# Patient Record
Sex: MALE | Race: WHITE | NOT HISPANIC OR LATINO | Employment: OTHER | URBAN - METROPOLITAN AREA
[De-identification: names, ages, dates, MRNs, and addresses within clinical notes are randomized per-mention and may not be internally consistent; named-entity substitution may affect disease eponyms.]

---

## 2018-08-15 ENCOUNTER — HOSPITAL ENCOUNTER (OUTPATIENT)
Facility: MEDICAL CENTER | Age: 73
End: 2018-08-15
Admitting: HOSPITALIST
Payer: MEDICARE

## 2018-08-15 ENCOUNTER — HOSPITAL ENCOUNTER (OUTPATIENT)
Facility: MEDICAL CENTER | Age: 73
End: 2018-08-17
Attending: HOSPITALIST | Admitting: HOSPITALIST
Payer: MEDICARE

## 2018-08-15 PROCEDURE — G0378 HOSPITAL OBSERVATION PER HR: HCPCS

## 2018-08-16 ENCOUNTER — APPOINTMENT (OUTPATIENT)
Dept: RADIOLOGY | Facility: MEDICAL CENTER | Age: 73
End: 2018-08-16
Attending: HOSPITALIST
Payer: MEDICARE

## 2018-08-16 PROBLEM — G92.8 TOXIC METABOLIC ENCEPHALOPATHY: Status: ACTIVE | Noted: 2018-08-16

## 2018-08-16 PROBLEM — G93.40 ENCEPHALOPATHY: Status: ACTIVE | Noted: 2018-08-16

## 2018-08-16 PROBLEM — E78.5 HLD (HYPERLIPIDEMIA): Status: ACTIVE | Noted: 2018-08-16

## 2018-08-16 PROBLEM — R00.1 BRADYCARDIA: Status: ACTIVE | Noted: 2018-08-16

## 2018-08-16 PROBLEM — E27.40 ADRENAL INSUFFICIENCY (HCC): Status: ACTIVE | Noted: 2018-08-16

## 2018-08-16 LAB
ALBUMIN SERPL BCP-MCNC: 3.5 G/DL (ref 3.2–4.9)
ALBUMIN/GLOB SERPL: 1.7 G/DL
ALP SERPL-CCNC: 20 U/L (ref 30–99)
ALT SERPL-CCNC: 11 U/L (ref 2–50)
ANION GAP SERPL CALC-SCNC: 10 MMOL/L (ref 0–11.9)
AST SERPL-CCNC: 17 U/L (ref 12–45)
BASOPHILS # BLD AUTO: 0.6 % (ref 0–1.8)
BASOPHILS # BLD: 0.05 K/UL (ref 0–0.12)
BILIRUB SERPL-MCNC: 0.4 MG/DL (ref 0.1–1.5)
BUN SERPL-MCNC: 15 MG/DL (ref 8–22)
CALCIUM SERPL-MCNC: 8.8 MG/DL (ref 8.5–10.5)
CHLORIDE SERPL-SCNC: 108 MMOL/L (ref 96–112)
CHOLEST SERPL-MCNC: 131 MG/DL (ref 100–199)
CO2 SERPL-SCNC: 23 MMOL/L (ref 20–33)
CREAT SERPL-MCNC: 0.93 MG/DL (ref 0.5–1.4)
EOSINOPHIL # BLD AUTO: 0.03 K/UL (ref 0–0.51)
EOSINOPHIL NFR BLD: 0.3 % (ref 0–6.9)
ERYTHROCYTE [DISTWIDTH] IN BLOOD BY AUTOMATED COUNT: 42.9 FL (ref 35.9–50)
EST. AVERAGE GLUCOSE BLD GHB EST-MCNC: 131 MG/DL
GLOBULIN SER CALC-MCNC: 2.1 G/DL (ref 1.9–3.5)
GLUCOSE SERPL-MCNC: 81 MG/DL (ref 65–99)
HBA1C MFR BLD: 6.2 % (ref 0–5.6)
HCT VFR BLD AUTO: 42.4 % (ref 42–52)
HDLC SERPL-MCNC: 57 MG/DL
HGB BLD-MCNC: 14.5 G/DL (ref 14–18)
IMM GRANULOCYTES # BLD AUTO: 0.04 K/UL (ref 0–0.11)
IMM GRANULOCYTES NFR BLD AUTO: 0.5 % (ref 0–0.9)
LDLC SERPL CALC-MCNC: 46 MG/DL
LYMPHOCYTES # BLD AUTO: 3.07 K/UL (ref 1–4.8)
LYMPHOCYTES NFR BLD: 35.4 % (ref 22–41)
MAGNESIUM SERPL-MCNC: 2 MG/DL (ref 1.5–2.5)
MCH RBC QN AUTO: 31.6 PG (ref 27–33)
MCHC RBC AUTO-ENTMCNC: 34.2 G/DL (ref 33.7–35.3)
MCV RBC AUTO: 92.4 FL (ref 81.4–97.8)
MONOCYTES # BLD AUTO: 0.76 K/UL (ref 0–0.85)
MONOCYTES NFR BLD AUTO: 8.8 % (ref 0–13.4)
NEUTROPHILS # BLD AUTO: 4.73 K/UL (ref 1.82–7.42)
NEUTROPHILS NFR BLD: 54.4 % (ref 44–72)
NRBC # BLD AUTO: 0 K/UL
NRBC BLD-RTO: 0 /100 WBC
PLATELET # BLD AUTO: 173 K/UL (ref 164–446)
PMV BLD AUTO: 9.3 FL (ref 9–12.9)
POTASSIUM SERPL-SCNC: 3.7 MMOL/L (ref 3.6–5.5)
PROT SERPL-MCNC: 5.6 G/DL (ref 6–8.2)
RBC # BLD AUTO: 4.59 M/UL (ref 4.7–6.1)
SODIUM SERPL-SCNC: 141 MMOL/L (ref 135–145)
TRIGL SERPL-MCNC: 140 MG/DL (ref 0–149)
TSH SERPL DL<=0.005 MIU/L-ACNC: 2.56 UIU/ML (ref 0.38–5.33)
WBC # BLD AUTO: 8.7 K/UL (ref 4.8–10.8)

## 2018-08-16 PROCEDURE — 700111 HCHG RX REV CODE 636 W/ 250 OVERRIDE (IP): Performed by: HOSPITALIST

## 2018-08-16 PROCEDURE — 80053 COMPREHEN METABOLIC PANEL: CPT

## 2018-08-16 PROCEDURE — 700102 HCHG RX REV CODE 250 W/ 637 OVERRIDE(OP): Performed by: HOSPITALIST

## 2018-08-16 PROCEDURE — G8980 MOBILITY D/C STATUS: HCPCS | Mod: CH

## 2018-08-16 PROCEDURE — 95951 EEG: CPT | Mod: 52

## 2018-08-16 PROCEDURE — 83735 ASSAY OF MAGNESIUM: CPT

## 2018-08-16 PROCEDURE — 36415 COLL VENOUS BLD VENIPUNCTURE: CPT

## 2018-08-16 PROCEDURE — 97161 PT EVAL LOW COMPLEX 20 MIN: CPT

## 2018-08-16 PROCEDURE — 70551 MRI BRAIN STEM W/O DYE: CPT

## 2018-08-16 PROCEDURE — A9270 NON-COVERED ITEM OR SERVICE: HCPCS | Performed by: HOSPITALIST

## 2018-08-16 PROCEDURE — G8989 SELF CARE D/C STATUS: HCPCS | Mod: CH

## 2018-08-16 PROCEDURE — 700105 HCHG RX REV CODE 258: Performed by: HOSPITALIST

## 2018-08-16 PROCEDURE — 85025 COMPLETE CBC W/AUTO DIFF WBC: CPT

## 2018-08-16 PROCEDURE — G8979 MOBILITY GOAL STATUS: HCPCS | Mod: CH

## 2018-08-16 PROCEDURE — A9270 NON-COVERED ITEM OR SERVICE: HCPCS | Performed by: NURSE PRACTITIONER

## 2018-08-16 PROCEDURE — 700102 HCHG RX REV CODE 250 W/ 637 OVERRIDE(OP): Performed by: NURSE PRACTITIONER

## 2018-08-16 PROCEDURE — G0378 HOSPITAL OBSERVATION PER HR: HCPCS

## 2018-08-16 PROCEDURE — 84443 ASSAY THYROID STIM HORMONE: CPT

## 2018-08-16 PROCEDURE — 99220 PR INITIAL OBSERVATION CARE,LEVL III: CPT | Performed by: HOSPITALIST

## 2018-08-16 PROCEDURE — G8987 SELF CARE CURRENT STATUS: HCPCS | Mod: CH

## 2018-08-16 PROCEDURE — G8988 SELF CARE GOAL STATUS: HCPCS | Mod: CH

## 2018-08-16 PROCEDURE — G8978 MOBILITY CURRENT STATUS: HCPCS | Mod: CH

## 2018-08-16 PROCEDURE — 83036 HEMOGLOBIN GLYCOSYLATED A1C: CPT

## 2018-08-16 PROCEDURE — 80061 LIPID PANEL: CPT

## 2018-08-16 PROCEDURE — 97165 OT EVAL LOW COMPLEX 30 MIN: CPT

## 2018-08-16 RX ORDER — IPRATROPIUM BROMIDE 21 UG/1
2 SPRAY, METERED NASAL
COMMUNITY
End: 2018-09-22 | Stop reason: CLARIF

## 2018-08-16 RX ORDER — HYDROCORTISONE 10 MG/1
5 TABLET ORAL DAILY
COMMUNITY
Start: 2018-08-16 | End: 2018-08-18

## 2018-08-16 RX ORDER — NIACIN 100 MG
100 TABLET ORAL DAILY
Status: DISCONTINUED | OUTPATIENT
Start: 2018-08-16 | End: 2018-08-16

## 2018-08-16 RX ORDER — LABETALOL HYDROCHLORIDE 5 MG/ML
10 INJECTION, SOLUTION INTRAVENOUS EVERY 4 HOURS PRN
Status: DISCONTINUED | OUTPATIENT
Start: 2018-08-16 | End: 2018-08-17 | Stop reason: HOSPADM

## 2018-08-16 RX ORDER — HYDRALAZINE HYDROCHLORIDE 20 MG/ML
10 INJECTION INTRAMUSCULAR; INTRAVENOUS
Status: DISCONTINUED | OUTPATIENT
Start: 2018-08-16 | End: 2018-08-17 | Stop reason: HOSPADM

## 2018-08-16 RX ORDER — CHOLECALCIFEROL (VITAMIN D3) 125 MCG
5 CAPSULE ORAL
Status: DISCONTINUED | OUTPATIENT
Start: 2018-08-16 | End: 2018-08-16

## 2018-08-16 RX ORDER — AMOXICILLIN 250 MG
2 CAPSULE ORAL 2 TIMES DAILY
Status: DISCONTINUED | OUTPATIENT
Start: 2018-08-16 | End: 2018-08-17 | Stop reason: HOSPADM

## 2018-08-16 RX ORDER — POLYETHYLENE GLYCOL 3350 17 G/17G
1 POWDER, FOR SOLUTION ORAL
Status: DISCONTINUED | OUTPATIENT
Start: 2018-08-16 | End: 2018-08-17 | Stop reason: HOSPADM

## 2018-08-16 RX ORDER — EZETIMIBE 10 MG/1
10 TABLET ORAL
Status: DISCONTINUED | OUTPATIENT
Start: 2018-08-16 | End: 2018-08-17 | Stop reason: HOSPADM

## 2018-08-16 RX ORDER — BISACODYL 10 MG
10 SUPPOSITORY, RECTAL RECTAL
Status: DISCONTINUED | OUTPATIENT
Start: 2018-08-16 | End: 2018-08-17 | Stop reason: HOSPADM

## 2018-08-16 RX ORDER — EZETIMIBE 10 MG/1
10 TABLET ORAL DAILY
Status: DISCONTINUED | OUTPATIENT
Start: 2018-08-16 | End: 2018-08-16

## 2018-08-16 RX ORDER — HYDROCORTISONE 5 MG/1
5 TABLET ORAL DAILY
Status: DISCONTINUED | OUTPATIENT
Start: 2018-08-16 | End: 2018-08-17 | Stop reason: HOSPADM

## 2018-08-16 RX ORDER — CHOLECALCIFEROL (VITAMIN D3) 125 MCG
5 CAPSULE ORAL
COMMUNITY
End: 2018-09-22 | Stop reason: CLARIF

## 2018-08-16 RX ORDER — NIACIN 100 MG
100 TABLET ORAL
Status: DISCONTINUED | OUTPATIENT
Start: 2018-08-16 | End: 2018-08-17 | Stop reason: HOSPADM

## 2018-08-16 RX ORDER — SODIUM CHLORIDE 9 MG/ML
1000 INJECTION, SOLUTION INTRAVENOUS ONCE
Status: COMPLETED | OUTPATIENT
Start: 2018-08-16 | End: 2018-08-16

## 2018-08-16 RX ADMIN — HYDROCORTISONE 5 MG: 5 TABLET ORAL at 18:23

## 2018-08-16 RX ADMIN — ASPIRIN 81 MG: 81 TABLET, DELAYED RELEASE ORAL at 05:53

## 2018-08-16 RX ADMIN — Medication 100 MG: at 20:13

## 2018-08-16 RX ADMIN — DOCUSATE SODIUM -SENNOSIDES 2 TABLET: 50; 8.6 TABLET, COATED ORAL at 05:52

## 2018-08-16 RX ADMIN — EZETIMIBE 10 MG: 10 TABLET ORAL at 20:14

## 2018-08-16 RX ADMIN — SODIUM CHLORIDE 1000 ML: 9 INJECTION, SOLUTION INTRAVENOUS at 02:15

## 2018-08-16 RX ADMIN — DOCUSATE SODIUM -SENNOSIDES 2 TABLET: 50; 8.6 TABLET, COATED ORAL at 18:23

## 2018-08-16 RX ADMIN — PREDNISONE 8 MG: 1 TABLET ORAL at 05:52

## 2018-08-16 ASSESSMENT — LIFESTYLE VARIABLES
EVER_SMOKED: YES
ALCOHOL_USE: NO

## 2018-08-16 ASSESSMENT — COPD QUESTIONNAIRES
DURING THE PAST 4 WEEKS HOW MUCH DID YOU FEEL SHORT OF BREATH: NONE/LITTLE OF THE TIME
IN THE PAST 12 MONTHS DO YOU DO LESS THAN YOU USED TO BECAUSE OF YOUR BREATHING PROBLEMS: DISAGREE/UNSURE
COPD SCREENING SCORE: 4
HAVE YOU SMOKED AT LEAST 100 CIGARETTES IN YOUR ENTIRE LIFE: YES
DO YOU EVER COUGH UP ANY MUCUS OR PHLEGM?: NO/ONLY WITH OCCASIONAL COLDS OR INFECTIONS

## 2018-08-16 ASSESSMENT — COGNITIVE AND FUNCTIONAL STATUS - GENERAL
SUGGESTED CMS G CODE MODIFIER MOBILITY: CH
DAILY ACTIVITIY SCORE: 24
SUGGESTED CMS G CODE MODIFIER MOBILITY: CH
MOBILITY SCORE: 24
MOBILITY SCORE: 24
SUGGESTED CMS G CODE MODIFIER DAILY ACTIVITY: CH
DAILY ACTIVITIY SCORE: 24
SUGGESTED CMS G CODE MODIFIER DAILY ACTIVITY: CH

## 2018-08-16 ASSESSMENT — ENCOUNTER SYMPTOMS
PHOTOPHOBIA: 0
WEAKNESS: 0
HEADACHES: 0
SPEECH CHANGE: 0
CONSTIPATION: 0
SEIZURES: 0
MYALGIAS: 0
BLOOD IN STOOL: 0
INSOMNIA: 0
PALPITATIONS: 0
CHILLS: 0
DIARRHEA: 0
SHORTNESS OF BREATH: 0
VOMITING: 0
FOCAL WEAKNESS: 0
BRUISES/BLEEDS EASILY: 0
FEVER: 0
SORE THROAT: 0
SPUTUM PRODUCTION: 0
NERVOUS/ANXIOUS: 0
DIZZINESS: 0
SENSORY CHANGE: 0
MEMORY LOSS: 1
ABDOMINAL PAIN: 0
DIAPHORESIS: 0
TREMORS: 0
CLAUDICATION: 0
HEMOPTYSIS: 0
LOSS OF CONSCIOUSNESS: 1
WHEEZING: 0
DEPRESSION: 0
COUGH: 0
TINGLING: 0
NAUSEA: 0
PND: 0
ORTHOPNEA: 0

## 2018-08-16 ASSESSMENT — PAIN SCALES - GENERAL
PAINLEVEL_OUTOF10: 0

## 2018-08-16 ASSESSMENT — GAIT ASSESSMENTS
DISTANCE (FEET): 500
GAIT LEVEL OF ASSIST: INDEPENDENT

## 2018-08-16 ASSESSMENT — PATIENT HEALTH QUESTIONNAIRE - PHQ9
1. LITTLE INTEREST OR PLEASURE IN DOING THINGS: NOT AT ALL
2. FEELING DOWN, DEPRESSED, IRRITABLE, OR HOPELESS: NOT AT ALL
SUM OF ALL RESPONSES TO PHQ9 QUESTIONS 1 AND 2: 0

## 2018-08-16 ASSESSMENT — ACTIVITIES OF DAILY LIVING (ADL): TOILETING: INDEPENDENT

## 2018-08-16 NOTE — PROGRESS NOTES
Direct Admit from Dr. Salcedo at [facility] Dr. Marmolejo is accepting for TIA/Stroke. ADT signed and held and will need to be released upon patient arrival.patient arriving via ground transport.

## 2018-08-16 NOTE — ASSESSMENT & PLAN NOTE
Sinus 40s-50s.  At baseline, but will still include in the differential.  Continue tele monitoring.

## 2018-08-16 NOTE — PROGRESS NOTES
[facility] Hospitalist Progress Note    Date of Service: 2018    Chief Complaint  73 y.o. male admitted 8/15/2018 with syncope x 1 minute followed by appox 1-1.5 hours of confusion.    Interval Problem Update  Feeling back to normal now.  Does not recall anything that happened or how he got to the hospital. No further episodes of unresponsiveness.  No seizure activity.     Is bradycardic on telemetry in the 40s-50s, which he states is his baseline & denies ever being symptomatic from it. Is also borderline hypotensive at baseline, usually around 100 systolic.    CT head, lab work, & UA from [facility] were reviewed & noted to be normal.  Repeat labs today were also normal.      Orthostatics negative. Has been cleared by PT/OT.  EEG showed no seizure activity.  Carotid duplex, echo, & MRI still pending.    Consultants/Specialty  Neurology    Disposition  Monitor overnight.        Review of Systems   Constitutional: Negative for chills, diaphoresis, fever and malaise/fatigue.   HENT: Negative for nosebleeds.    Respiratory: Negative for cough, hemoptysis, sputum production, shortness of breath and wheezing.    Cardiovascular: Negative for chest pain, palpitations, orthopnea, claudication, leg swelling and PND.   Gastrointestinal: Negative for abdominal pain, blood in stool, constipation, diarrhea, melena, nausea and vomiting.   Genitourinary: Negative for dysuria, frequency, hematuria and urgency.   Neurological: Positive for loss of consciousness. Negative for dizziness, tingling, tremors, sensory change, speech change, focal weakness, seizures, weakness and headaches.   Endo/Heme/Allergies: Does not bruise/bleed easily.   Psychiatric/Behavioral: Positive for memory loss. Negative for depression. The patient is not nervous/anxious and does not have insomnia.    All other systems reviewed and are negative.     Physical Exam  Laboratory/Imaging   Hemodynamics  Temp (24hrs), Av.7 °C (98 °F), Min:36.3 °C (97.3  °F), Max:37.3 °C (99.1 °F)   Temperature: 36.6 °C (97.8 °F)  Pulse  Av  Min: 43  Max: 83    Blood Pressure : 129/70      Respiratory  Respiration: 16, Pulse Oximetry: 97 %    Fluids  No intake or output data in the 24 hours ending 18 1452    Nutrition  Orders Placed This Encounter   Procedures   • Diet Order Regular     Standing Status:   Standing     Number of Occurrences:   1     Order Specific Question:   Diet:     Answer:   Regular [1]     Physical Exam   Constitutional: He is oriented to person, place, and time. He appears well-developed and well-nourished. He is active and cooperative. No distress.   HENT:   Head: Normocephalic and atraumatic.   Mouth/Throat: Oropharynx is clear and moist.   Eyes: Pupils are equal, round, and reactive to light. Conjunctivae and EOM are normal. Right eye exhibits no discharge. Left eye exhibits no discharge. No scleral icterus.   Neck: Normal range of motion. Neck supple. No JVD present. Thyromegaly present.   Cardiovascular: Regular rhythm, normal heart sounds and intact distal pulses.  Bradycardia present.  Exam reveals no gallop and no friction rub.    No murmur heard.  Pulmonary/Chest: Effort normal and breath sounds normal. No stridor. No tachypnea. No respiratory distress. He has no decreased breath sounds. He has no wheezes. He has no rhonchi. He has no rales.   Abdominal: Soft. Normal appearance and bowel sounds are normal. He exhibits no distension. There is no hepatomegaly. There is no tenderness. There is no rebound, no guarding and negative Matthews's sign.   Musculoskeletal: Normal range of motion. He exhibits no edema.   Neurological: He is alert and oriented to person, place, and time. He has normal strength. No cranial nerve deficit or sensory deficit. Coordination and gait normal. GCS eye subscore is 4. GCS verbal subscore is 5. GCS motor subscore is 6.   Skin: Skin is warm, dry and intact. No rash noted. He is not diaphoretic. No erythema. No pallor.    Psychiatric: He has a normal mood and affect. His speech is normal and behavior is normal. Judgment and thought content normal. He exhibits abnormal recent memory.   Nursing note and vitals reviewed.      Recent Labs      08/16/18 0414   WBC  8.7   RBC  4.59*   HEMOGLOBIN  14.5   HEMATOCRIT  42.4   MCV  92.4   MCH  31.6   MCHC  34.2   RDW  42.9   PLATELETCT  173   MPV  9.3     Recent Labs      08/16/18 0414   SODIUM  141   POTASSIUM  3.7   CHLORIDE  108   CO2  23   GLUCOSE  81   BUN  15   CREATININE  0.93   CALCIUM  8.8             Recent Labs      08/16/18 0414   TRIGLYCERIDE  140   HDL  57   LDL  46          Assessment/Plan     * Toxic metabolic encephalopathy- (present on admission)   Assessment & Plan    Resolved now. No further episodes.  Lab work unremarkable, orthostatics negative, EEG normal.  CT head at [facility] was negative.  Carotid duplex, echo, & MRI still pending.  Has been cleared by PT/OT.  Neurology consulted per the patient's request.        Adrenal insufficiency, chronic- (present on admission)   Assessment & Plan    Continue home steroids.        Bradycardia- (present on admission)   Assessment & Plan    Sinus 40s-50s.  At baseline, but will still include in the differential.  Continue tele monitoring.        HLD (hyperlipidemia)- (present on admission)   Assessment & Plan    Lipid profile today was normal.  Continue home zetia & niacin.          Quality-Core Measures   Reviewed items::  EKG reviewed, Radiology images reviewed, Labs reviewed and Medications reviewed  Akhtar catheter::  No Akhtar  DVT prophylaxis pharmacological::  Not indicated at this time, ambulatory  DVT prophylaxis - mechanical:  Not indicated at this time, ambulatory  Ulcer Prophylaxis::  No  Assessed for rehabilitation services:  Patient was assess for and/or received rehabilitation services during this hospitalization

## 2018-08-16 NOTE — CARE PLAN
Problem: Safety  Goal: Will remain free from injury  Outcome: PROGRESSING AS EXPECTED  Reviewed patient's mobility status, discussed with care team of patient needs, verifying appropriate safety precautions in place, providing patient education, ensuring call lights are within reach, non-slip socks in use, evaluating needs alarms & monitoring every shift, continuing with current plan of care.      Problem: Knowledge Deficit  Goal: Knowledge of disease process/condition, treatment plan, diagnostic tests, and medications will improve  Outcome: PROGRESSING AS EXPECTED  Educated patient about POC, activities, encouraging patient to ask questions, providing answers to patient's questions, educating patient about medications, encouraging patient involvement in care process. Continuing with current POC.

## 2018-08-16 NOTE — EEG PROGRESS NOTE
VIDEO ELECTROENCEPHALOGRAM REPORT      Referring MD: Dr. Kumar.     DOS:  8/16/2018 (total recording of 25 minutes).     INDICATION:  Jayesh Kessler 73 y.o. male presenting with episode of confusion and decreased responsiveness. Rule out seizure.     CURRENT ANTIEPILEPTIC REGIMEN: None.     TECHNIQUE: 30 channel video electroencephalogram (EEG) was performed in accordance with the international 10-20 system. The study was reviewed in bipolar and referential montages. The recording examined the patient during wakeful and drowsy/sleep state(s).     DESCRIPTION OF THE RECORD:  During the wakefulness, the background showed a symmetrical 12 Hz alpha activity posteriorly with amplitude of 70 mV.  There was reactivity to eye closure/opening.  A normal anterior-posterior gradient was noted with faster beta frequencies seen anteriorly.  During drowsiness, theta/delta frequencies were seen.    During the sleep state, background shows diffuse high-amplitude 4-5 Hz delta activity.  Symmetrical high-amplitude sleep spindles and vertex sharps were seen in the leads over the central regions.     ACTIVATION PROCEDURES:   Intermittent Photic stimulation was performed in a stepwise fashion from 1 to 30 Hz and elicited a normal response (photic driving), most noticeable in the posterior leads.      ICTAL AND/OR INTERICTAL FINDINGS:   No focal or generalized epileptiform activity noted. No regional slowing was seen during this routine study.  No clinical events or seizures were reported or recorded during the study.     EKG: sampling of the EKG recording demonstrated sinus rhythm.    EVENTS:  None.     INTERPRETATION:  This is a normal video EEG recording in the awake, drowsy, and sleep state(s).  Clinical correlation is recommended.    Note: A normal EEG does not rule out epilepsy.  If the clinical suspicion remains high for seizures, a prolonged recording to capture clinical or subclinical events may be helpful.        Henrique  MD Lou   Epilepsy and Neurodiagnostics.   Clinical  of Neurology [facility].   Diplomate in Neurology, Epilepsy, and Electrodiagnostic Medicine.   Office: [phone]  Fax: [phone]

## 2018-08-16 NOTE — H&P
Hospital Medicine History and Physical    Date of Service  8/16/2018    Chief Complaint  Transfer from outside facility for episode of syncope with encephalopathy    History of Presenting Illness  73 y.o. male who presented on 8/15/2018 in transfer from outside facility as noted above.  The patient was brought into the outside facility after acute onset confusion after eating dinner.  By report, the patient apparently slumped over, was nonresponsive for 1 minute, and when awakened, he was confused with short-term memory deficits.  His wife checked his blood pressure at that time and it was 120/70.  He does carry a history of CVA in his father.  Additionally, he was admitted to an outside hospital 2 weeks ago for adrenal insufficiency issues and has been on steroids.  Other than reporting malaise and fatigue, the patient is currently awake, alert, and has had no other somatic complaints.  He denies any focal deficits such as numbness, tingling, or weakness.  He does returned from [place] after spending 2 weeks in [facility] and [facility].  Otherwise, bedside exam shows no neurologic deficits.    Additional workup at outside facility includes WBC 9.2 hemoglobin 16 hematocrit 46.5 platelet 198 sodium 136 potassium 3.8 chloride 102 CO2 27 BUN 12 creatinine 1.14 blood glucose 90 troponin less than 0.05 UA negative for nitrites or leukocyte esterase.      Primary Care Physician  Pcp Pt States None    Consultants  None    Code Status  Full    Review of Systems  Review of Systems   Constitutional: Negative for chills and fever.   HENT: Negative for congestion and sore throat.    Eyes: Negative for photophobia.   Respiratory: Negative for cough, shortness of breath and wheezing.    Cardiovascular: Negative for chest pain and palpitations.   Gastrointestinal: Negative for abdominal pain, diarrhea, nausea and vomiting.   Genitourinary: Negative for dysuria.   Musculoskeletal: Negative for myalgias.   Skin: Negative.     Neurological: Negative for dizziness, tingling, focal weakness and headaches.   Psychiatric/Behavioral: Negative for depression and suicidal ideas.        Past Medical History  Past Medical History:   Diagnosis Date   • Fracture 1977    Jaw - lower condyle fracture   • Allergy    • Asthma    • Back pain     chronic   • Fall    • GERD (gastroesophageal reflux disease)    • Hyperlipidemia    • IBD (inflammatory bowel disease)        Surgical History  Past Surgical History:   Procedure Laterality Date   • OTHER  2015    Removal of large polyp and 2 med sized polyps   • OTHER Left 1980    Left jaw surgery following surgery   • LYMPH NODE EXCISION  1976   • OTHER      Jaw Surgery   • OTHER ORTHOPEDIC SURGERY Left     Left jaw surgery after injury   • TONSILLECTOMY         Medications  No current facility-administered medications on file prior to encounter.      Current Outpatient Prescriptions on File Prior to Encounter   Medication Sig Dispense Refill   • ezetimibe (ZETIA) 10 MG Tab Take 10 mg by mouth every day.     • NIACIN PO Take  by mouth.     • aspirin 81 MG tablet Take 81 mg by mouth every day.     • CLONAZEPAM PO Take  by mouth.     • predniSONE (DELTASONE) 10 MG Tab Take 8 mg by mouth every day.     • levalbuterol (XOPENEX) 0.63 MG/3ML Nebu Soln 0.63 mg by Nebulization route every four hours as needed for Shortness of Breath.     • levalbuterol (XOPENEX HFA) 45 MCG/ACT inhaler Inhale 1-2 Puffs by mouth every four hours as needed for Shortness of Breath.     • Polyethylene Glycol 3350 (MIRALAX PO) Take  by mouth.     • Cholecalciferol (D3 ADULT PO) Take  by mouth.         Family History  Family History   Problem Relation Age of Onset   • Allergies Mother    • Asthma Mother    • Heart Attack Father    • Heart Disease Father    • Hyperlipidemia Father    • Hypertension Father    • Kidney stones Father    • Stroke Father    • Allergies Sister    • Arrythmia Sister    • Depression Sister    • Diabetes Sister    •  Heart Attack Sister    • Heart Disease Sister    • Hypertension Brother    • Hyperlipidemia Brother    • Cancer Brother    • Heart Disease Brother    • Heart Attack Brother        Social History  Social History   Substance Use Topics   • Smoking status: Current Every Day Smoker     Packs/day: 1.00     Years: 20.00   • Smokeless tobacco: Never Used   • Alcohol use No       Allergies  Allergies   Allergen Reactions   • Avelox [Moxifloxacin Hcl In Nacl]      Inability to function - muscle pain   • Statins [Hmg-Coa-R Inhibitors]      Muscle and Joint Pain   • Talwin Nausea        Physical Exam  Laboratory   Hemodynamics  Temp (24hrs), Av.3 °C (97.3 °F), Min:36.3 °C (97.3 °F), Max:36.3 °C (97.3 °F)   Temperature: 36.3 °C (97.3 °F)  Pulse  Av  Min: 83  Max: 83    Blood Pressure : 118/73      Respiratory      Respiration: 16, Pulse Oximetry: 95 %             Physical Exam   Constitutional: He is oriented to person, place, and time. No distress.   HENT:   Head: Normocephalic and atraumatic.   Right Ear: External ear normal.   Left Ear: External ear normal.   Eyes: EOM are normal. Right eye exhibits no discharge. Left eye exhibits no discharge.   Neck: Neck supple. No JVD present.   Cardiovascular: Normal rate, regular rhythm and normal heart sounds.    Pulmonary/Chest: Effort normal and breath sounds normal. No respiratory distress. He exhibits no tenderness.   Abdominal: Soft. Bowel sounds are normal. He exhibits no distension. There is no tenderness.   Musculoskeletal: He exhibits no edema.   Neurological: He is alert and oriented to person, place, and time. No cranial nerve deficit.   Skin: Skin is dry. He is not diaphoretic. No erythema.   Psychiatric: He has a normal mood and affect. His behavior is normal.   Nursing note and vitals reviewed.    Capillary refill less than 3 seconds, distal pulses intact            No results for input(s): ALTSGPT, ASTSGOT, ALKPHOSPHAT, TBILIRUBIN, DBILIRUBIN, GAMMAGT, AMYLASE,  LIPASE, ALB, PREALBUMIN, GLUCOSE in the last 72 hours.              No results found for: TROPONINI    Imaging  No results found.   Assessment/Plan     Anticipate that patient will need less than 2 midnights for management of the discussed medical issues.    * Encephalopathy   Assessment & Plan    This has since resolved, etiology is still unclear.  He has had a mixed picture of symptoms including an episode of syncope and confusion following.  Etiology therefore may include both TIA given his family history of CVA as well as seizure.  He has no leukocytosis, he has no electrolyte imbalances, his blood sugars are stable, his first troponin level is negative.  CT head scan was obtained at the outside facility however they did not send a report or images however by verbal report, it was negative.  We will obtain formal reports from the outside facility.  In the meantime, the patient will be monitored closely on telemetry, I will obtain every 4 hours neuro checks, I will continue his home Zetia and aspirin and plan to check an MRI, echocardiogram, and EEG.  Pending the results of these studies, we will consider neurology consultation.        HLD (hyperlipidemia)   Assessment & Plan    This is chronic, continue home aspirin, Zetia and niacin.        Adrenal insufficiency (HCC)   Assessment & Plan    This is chronic, continue home steroids.          Prophylaxis: Sequential compression devices for DVT prophylaxis, no PPI indicated, bowel protocol as needed    I spent 31 minutes in additional non-face to face time reviewing charts transferred from outside facility including review of records, pertinent lab data and studies in order to coordinate care and plan therapeutic interventions, specialist consultation where needed and future disposition of care.

## 2018-08-16 NOTE — THERAPY
"Occupational Therapy Evaluation completed.   Functional Status:  Independent with ADLs and txfs  Plan of Care: Patient with no further skilled OT needs in the acute care setting at this time  Discharge Recommendations: Post-acute therapy Currently anticipate no further skilled therapy needs once patient is discharged from the inpatient setting.    See \"Rehab Therapy-Acute\" Patient Summary Report for complete documentation.    "

## 2018-08-16 NOTE — CONSULTS
DATE OF SERVICE:  08/16/2018    REQUESTING PHYSICIAN:  Tae Nieto MD    REASON FOR CONSULTATION:  Syncope.    HISTORY OF PRESENT ILLNESS:  The patient is a 73-year-old right-handed male   with past medical history significant for adrenal insufficiency and   hyperlipidemia who was brought to hospital as a transfer from outside facility   for evaluation of a syncopal episode and confusion.  Apparently, he was at   dinner with his wife when he slumped over and passed out.  This was not   associated with any seizure activity.  He was unresponsive for 1 minute and   then when his wife was trying to bring him to the hospital, he was very   confused and telling her where we are going and why we are going there.  They   got to the hospital.  He was still confused and did not know why they are in   the hospital.  Patient underwent a brain CT, which did not reveal acute   abnormalities.  His lab works were unremarkable.  Today, he underwent EEG   which was reported as completely normal.  Subsequently, he had brain MRI,   which also reported as normal.  His wife is very concerned of some recent   behavioral changes and she tells me she had a new tires on her car and then 2   weeks later, patient took her car to a tire place and replaced all these new   tires with a truck tires which was very unusual and patient had no explanation   for that.  They live in [place], [place] and apparently following up with   neurologist up there.  There was a question of Lewy body dementia, although   patient has no cognitive deficit at present time.    PAST MEDICAL HISTORY:  Significant for adrenal insufficiency due to chronic   steroid use and history of hyperlipidemia.    MEDICATIONS:  Reviewed as per MAR.  He is on aspirin, Zetia, and niacin.    ALLERGIES:  AVELOX, STATIN, AND TALWIN.    SOCIAL HISTORY:  He is a current smoker, he smokes 1 pack per day for 20   years.  Has no history of alcohol or drug abuse.    FAMILY HISTORY:  Reviewed  and noncontributory.  There is no history of   premature heart attack or stroke.  No history of seizure.    REVIEW OF SYSTEMS:  As above.  All other systems are normal.  Please also see   Dr. Basia Schwarz's review of system in her H and P dated 08/16/2018.    PHYSICAL EXAMINATION:  VITAL SIGNS:  Today, heart rate of 43, blood pressure 137/81, respirations 16,   O2 sat 97% on room air and temperature 36.6.  NECK:  Supple, no carotid bruit.  CARDIOVASCULAR:  Regular rate and rhythm.  LUNGS:  Clear.  ABDOMEN:  Soft.  EXTREMITIES:  No cyanosis or clubbing.  NEUROLOGIC:  He is awake, alert, fully oriented.  Speech and memory within   normal limits.  Facial motor and sensation intact.  Extraocular movements are   full.  Visual fields are full to confrontation.  Hearing is intact to finger   rub bilaterally.  Tongue midline and protrudes symmetrically.  Palate elevates   symmetrically.  Shoulder shrugs are normal.  Motor examination revealed   normal strength to direct testing of both upper and lower extremity, proximal   and distal.  Sensation is intact to light touch, temperature, and pinprick.    Coordination intact finger-to-nose and heel-to-shin testing.  Deep tendon   reflexes are 1+ and equal.  Plantar reflexes are downgoing.    ASSESSMENT AND PLAN:  A 73-year-old male with a syncopal episode.  His brain   MRI and EEG were unremarkable.  I doubt this is neurogenic.  He had   bradycardia with heart rate of 43.  This may be a contributing factor.  This   needs to be evaluated by cardiology.  Patient's wife reports some behavioral   changes recently for which he is seeing neurologist in [place], [place].  At the   present time, I do not have any clear diagnosis based on his EEG and MRI and   clinical exam.  I asked him to do serial subtraction of 7 from 100 and he did   that perfectly normal and very quick.  I would suggest follow up with   neurology as an outpatient with regard to his recent behavioral changes.    Please  call me if there are any questions.       ____________________________________     MD DC Villa    DD:  08/16/2018 15:06:37  DT:  08/16/2018 15:20:28    D#:  [id number]  Job#:  [id number]

## 2018-08-16 NOTE — ASSESSMENT & PLAN NOTE
Resolved now. No further episodes.  Lab work unremarkable, orthostatics negative, EEG normal.  CT head at [facility] hospital was negative.  Carotid duplex, echo, & MRI still pending.  Has been cleared by PT/OT.  Neurology consulted per the patient's request.

## 2018-08-16 NOTE — PROGRESS NOTES
Monitor Summary: SB 47-51, MO .18, QRS .10, QT .44 with HR as low as 40 per strip from monitor room

## 2018-08-16 NOTE — PROGRESS NOTES
2 RN skin check completed. Pt's skin intact, all bony prominences observed. Pt has purple discolorations on bilateral lower forearms.

## 2018-08-16 NOTE — THERAPY
"Physical Therapy Evaluation completed.   Bed Mobility:  Supine to Sit: Independent  Transfers: Sit to Stand: Independent  Gait: Level Of Assist: Independent with No Equipment Needed       Plan of Care: Patient with no further skilled PT needs in the acute care setting at this time  Discharge Recommendations: Equipment: No Equipment Needed.    See \"Rehab Therapy-Acute\" Patient Summary Report for complete documentation.   PT eval complete. Mr. Kessler presents with no functional limitations compared to baseline. He is not currently being actively followed by skilled acute physical therapy however he may be seen for an additional visit at the request of the attending provider in the next 30 days.  "

## 2018-08-16 NOTE — PROGRESS NOTES
Received report via telephone and assumed patient care at 0045. Pt is alert and oriented x 4. Pt denies pain at this time. NIH score 0, no deficits. Med reconciliation completed. Telemetry monitor in place. Bed locked in lowest position, call light within reach, hourly rounding in place.

## 2018-08-16 NOTE — PROCEDURES
VIDEO ELECTROENCEPHALOGRAM REPORT        Referring MD: Dr. Kumar.      DOS:  8/16/2018 (total recording of 25 minutes).      INDICATION:  Jayesh Kessler 73 y.o. male presenting with episode of confusion and decreased responsiveness. Rule out seizure.      CURRENT ANTIEPILEPTIC REGIMEN: None.      TECHNIQUE: 30 channel video electroencephalogram (EEG) was performed in accordance with the international 10-20 system. The study was reviewed in bipolar and referential montages. The recording examined the patient during wakeful and drowsy/sleep state(s).      DESCRIPTION OF THE RECORD:  During the wakefulness, the background showed a symmetrical 12 Hz alpha activity posteriorly with amplitude of 70 mV.  There was reactivity to eye closure/opening.  A normal anterior-posterior gradient was noted with faster beta frequencies seen anteriorly.  During drowsiness, theta/delta frequencies were seen.     During the sleep state, background shows diffuse high-amplitude 4-5 Hz delta activity.  Symmetrical high-amplitude sleep spindles and vertex sharps were seen in the leads over the central regions.      ACTIVATION PROCEDURES:   Intermittent Photic stimulation was performed in a stepwise fashion from 1 to 30 Hz and elicited a normal response (photic driving), most noticeable in the posterior leads.        ICTAL AND/OR INTERICTAL FINDINGS:   No focal or generalized epileptiform activity noted. No regional slowing was seen during this routine study.  No clinical events or seizures were reported or recorded during the study.      EKG: sampling of the EKG recording demonstrated sinus rhythm.     EVENTS:  None.      INTERPRETATION:  This is a normal video EEG recording in the awake, drowsy, and sleep state(s).  Clinical correlation is recommended.     Note: A normal EEG does not rule out epilepsy.  If the clinical suspicion remains high for seizures, a prolonged recording to capture clinical or subclinical events may be  helpful.           Henrique Blake MD   Epilepsy and Neurodiagnostics.   Clinical  of Neurology [facility].   Diplomate in Neurology, Epilepsy, and Electrodiagnostic Medicine.   Office: [phone]  Fax: [phone]       SIMI / VIMAL    DD:  08/16/2018 12:48:34  DT:  08/16/2018 13:05:27    D#:  [id number]  Job#:  [id number]

## 2018-08-16 NOTE — CARE PLAN
Problem: Safety  Goal: Will remain free from falls  Outcome: PROGRESSING AS EXPECTED  Patient steady with no assistive devices.

## 2018-08-17 VITALS
DIASTOLIC BLOOD PRESSURE: 75 MMHG | WEIGHT: 157.63 LBS | RESPIRATION RATE: 16 BRPM | OXYGEN SATURATION: 98 % | TEMPERATURE: 97.7 F | BODY MASS INDEX: 22.57 KG/M2 | HEIGHT: 70 IN | HEART RATE: 50 BPM | SYSTOLIC BLOOD PRESSURE: 126 MMHG

## 2018-08-17 PROBLEM — G92.8 TOXIC METABOLIC ENCEPHALOPATHY: Status: RESOLVED | Noted: 2018-08-16 | Resolved: 2018-08-17

## 2018-08-17 LAB
AMMONIA PLAS-SCNC: 32 UMOL/L (ref 11–45)
FOLATE SERPL-MCNC: 12.3 NG/ML
LV EJECT FRACT  99904: 60
LV EJECT FRACT MOD 2C 99903: 60.98
LV EJECT FRACT MOD 4C 99902: 56.72
LV EJECT FRACT MOD BP 99901: 57.68
TREPONEMA PALLIDUM IGG+IGM AB [PRESENCE] IN SERUM OR PLASMA BY IMMUNOASSAY: NON REACTIVE
VIT B12 SERPL-MCNC: 627 PG/ML (ref 211–911)

## 2018-08-17 PROCEDURE — 93880 EXTRACRANIAL BILAT STUDY: CPT

## 2018-08-17 PROCEDURE — 82746 ASSAY OF FOLIC ACID SERUM: CPT

## 2018-08-17 PROCEDURE — 99217 PR OBSERVATION CARE DISCHARGE: CPT | Performed by: INTERNAL MEDICINE

## 2018-08-17 PROCEDURE — 93306 TTE W/DOPPLER COMPLETE: CPT | Mod: 26 | Performed by: INTERNAL MEDICINE

## 2018-08-17 PROCEDURE — A9270 NON-COVERED ITEM OR SERVICE: HCPCS | Performed by: HOSPITALIST

## 2018-08-17 PROCEDURE — 82140 ASSAY OF AMMONIA: CPT

## 2018-08-17 PROCEDURE — 36415 COLL VENOUS BLD VENIPUNCTURE: CPT

## 2018-08-17 PROCEDURE — 82607 VITAMIN B-12: CPT

## 2018-08-17 PROCEDURE — G0378 HOSPITAL OBSERVATION PER HR: HCPCS

## 2018-08-17 PROCEDURE — 93306 TTE W/DOPPLER COMPLETE: CPT

## 2018-08-17 PROCEDURE — 86780 TREPONEMA PALLIDUM: CPT

## 2018-08-17 PROCEDURE — 700111 HCHG RX REV CODE 636 W/ 250 OVERRIDE (IP): Performed by: HOSPITALIST

## 2018-08-17 PROCEDURE — 93880 EXTRACRANIAL BILAT STUDY: CPT | Mod: 26 | Performed by: SURGERY

## 2018-08-17 PROCEDURE — 700102 HCHG RX REV CODE 250 W/ 637 OVERRIDE(OP): Performed by: HOSPITALIST

## 2018-08-17 RX ADMIN — PREDNISONE 8 MG: 1 TABLET ORAL at 05:10

## 2018-08-17 RX ADMIN — DOCUSATE SODIUM -SENNOSIDES 2 TABLET: 50; 8.6 TABLET, COATED ORAL at 05:10

## 2018-08-17 RX ADMIN — ASPIRIN 81 MG: 81 TABLET, DELAYED RELEASE ORAL at 05:48

## 2018-08-17 ASSESSMENT — PAIN SCALES - GENERAL
PAINLEVEL_OUTOF10: 0
PAINLEVEL_OUTOF10: 0

## 2018-08-17 NOTE — DISCHARGE PLANNING
Anticipated Discharge Disposition: Home    Action: LSW made attempt to see pt bedside. Pt was involved with ECHO. LSW will attempt at a later time.    Barriers to Discharge: Medical Clearance    Plan: LSW to f/u with pt

## 2018-08-17 NOTE — CARE PLAN
Problem: Venous Thromboembolism (VTW)/Deep Vein Thrombosis (DVT) Prevention:  Goal: Patient will participate in Venous Thrombosis (VTE)/Deep Vein Thrombosis (DVT)Prevention Measures  Outcome: NOT MET

## 2018-08-17 NOTE — PROGRESS NOTES
"Patient upset because echo and carotid were not done today stating \"i just wasted 8 hours of my day.\" Patient is A&Ox4. Denies pain. POC discussed with patient. Tele monitor in place. Regular diet. Bed in lowest position, call light within reach. Hourly rounding in place.   "

## 2018-08-17 NOTE — PROGRESS NOTES
NEUROLOGY PROGRESS NOTE      Background:  73 y.o. male was admitted on 8/15/2018 10:18 PM for TIA/Stroke  TIA (transient ischemic attack)  Stroke (HCC).  He is being followed by Neurology for syncope.    SUBJECTIVE: No new complaints, doing well. He appears reasonable with normal conversation.    NEUROLOGICAL EXAM:    He is awake, alert, fully oriented.  Speech and memory within   normal limits.  Facial motor and sensation intact.  Extraocular movements are   full.  Visual fields are full to confrontation.  Hearing is intact to finger   rub bilaterally.  Tongue midline and protrudes symmetrically.  Palate elevates   symmetrically.  Shoulder shrugs are normal.  Motor examination revealed   normal strength to direct testing of both upper and lower extremity, proximal   and distal.  Sensation is intact to light touch, temperature, and pinprick.    Coordination intact finger-to-nose and heel-to-shin testing.  Deep tendon   reflexes are 1+ and equal.  Plantar reflexes are downgoing.    OBJECTIVE:     NEUROIMAGING:    Brain MRI 8/16/18:  1.  No acute abnormality.  2.  Mild cerebral atrophy.  3.  A few nonspecific T2 hyperintensities in the subcortical and periventricular white matter likely representing nonspecific foci of gliosis.    EEG: This is a normal video EEG recording in the awake, drowsy, and sleep state(s).  Clinical correlation is recommended.    MEDICATIONS:  Current Facility-Administered Medications   Medication Dose   • predniSONE (DELTASONE) tablet 8 mg  8 mg   • aspirin EC (ECOTRIN) tablet 81 mg  81 mg   • senna-docusate (PERICOLACE or SENOKOT S) 8.6-50 MG per tablet 2 Tab  2 Tab    And   • polyethylene glycol/lytes (MIRALAX) PACKET 1 Packet  1 Packet    And   • magnesium hydroxide (MILK OF MAGNESIA) suspension 30 mL  30 mL    And   • bisacodyl (DULCOLAX) suppository 10 mg  10 mg   • labetalol (NORMODYNE,TRANDATE) injection 10 mg  10 mg    Or   • hydrALAZINE (APRESOLINE) injection 10 mg  10 mg   •  "ezetimibe (ZETIA) tablet 10 mg  10 mg   • niacin tablet 100 mg  100 mg   • hydrocortisone (CORTEF) tablet 5 mg  5 mg       Blood pressure 133/78, pulse (!) 53, temperature 36.6 °C (97.8 °F), resp. rate 16, height 1.778 m (5' 10\"), weight 71.5 kg (157 lb 10.1 oz), SpO2 96 %.        Recent Labs      08/16/18   0414   WBC  8.7   RBC  4.59*   HEMOGLOBIN  14.5   HEMATOCRIT  42.4   MCV  92.4   MCH  31.6   MCHC  34.2   RDW  42.9   PLATELETCT  173   MPV  9.3     Recent Labs      08/16/18   0414   SODIUM  141   POTASSIUM  3.7   CHLORIDE  108   CO2  23   GLUCOSE  81   BUN  15       No results found for this or any previous visit.     ASSESSMENT AND PLAN:   A 73-year-old male with a syncopal episode.  His brain   MRI and EEG were unremarkable.  Patient's wife reports some behavioral   changes recently for which he is seeing neurologist in [place], [place].   I would suggest follow up with neurology as well as neuropsychologist as an outpatient with regard to his recent behavioral changes.    I will sign off, Please call me if there are any questions.  "

## 2018-08-17 NOTE — PROGRESS NOTES
Monitor summary: SB-SR 54-77, KS .20, QRS .10, QT .40 with rare PVCs per strip from monitor room.

## 2018-08-17 NOTE — DISCHARGE INSTRUCTIONS
Discharge Instructions    Discharged to home by car with friend. Discharged via walking, hospital escort: Yes.  Special equipment needed: Not Applicable    Be sure to schedule a follow-up appointment with your primary care doctor or any specialists as instructed.     Discharge Plan:   Diet Plan: Discussed  Activity Level: Discussed  Confirmed Follow up Appointment: Appointment Scheduled  Confirmed Symptoms Management: Discussed  Medication Reconciliation Updated: Yes  Influenza Vaccine Indication: Patient Refuses    I understand that a diet low in cholesterol, fat, and sodium is recommended for good health. Unless I have been given specific instructions below for another diet, I accept this instruction as my diet prescription.   Other diet: regular    Special Instructions: None    · Is patient discharged on Warfarin / Coumadin?   No     Depression / Suicide Risk    As [facility] are [facility], it is important to learn how to keep safe from harming yourself.    Recognize the warning signs:  · Abrupt changes in personality, positive or negative- including increase in energy   · Giving away possessions  · Change in eating patterns- significant weight changes-  positive or negative  · Change in sleeping patterns- unable to sleep or sleeping all the time   · Unwillingness or inability to communicate  · Depression  · Unusual sadness, discouragement and loneliness  · Talk of wanting to die  · Neglect of personal appearance   · Rebelliousness- reckless behavior  · Withdrawal from people/activities they love  · Confusion- inability to concentrate     If you or a loved one observes any of these behaviors or has concerns about self-harm, here's what you can do:  · Talk about it- your feelings and reasons for harming yourself  · Remove any means that you might use to hurt yourself (examples: pills, rope, extension cords, firearm)  · Get professional help from the community (Mental Health, Substance  Abuse, psychological counseling)  · Do not be alone:Call your Safe Contact- someone whom you trust who will be there for you.  · Call your local CRISIS HOTLINE 340-7814 or 343-404-3812  · Call your local Children's Mobile Crisis Response Team [place] (325) 821-5686 or www.[employer]  · Call the toll free National Suicide Prevention Hotlines   · National Suicide Prevention Lifeline 011-661-HUQG (1925)  · National Hope Line Network 800-SUICIDE (652-5464)

## 2018-08-18 NOTE — PROGRESS NOTES
Monitor Summary:  SB 55-71, [place] .20, QRS .10, QT .40 with occasional pvc per strip from monitor room.

## 2018-08-18 NOTE — DISCHARGE SUMMARY
"Hospital Medicine Discharge Note     Patient ID:  Jayesh Kessler  [id number]  73 y.o.male  1945    Admit Date:  8/15/2018       Discharge Date:  8/17/2018    Primary Care Provider: Pcp Pt States None    Admitting Physician: Wendy Marmolejo M.D.  Discharging Physician: HANNAH Castillo/Tae Nieot MD    Chief Complaint: Syncope followed by confusion    Discharge Diagnoses:   Principal Problem:    Toxic metabolic encephalopathy  Active Problems:    Adrenal insufficiency, chronic    HLD (hyperlipidemia)    Bradycardia    Chronic Medical Problems:  Past Medical History:   Diagnosis Date   • Allergy    • Asthma    • Back pain     chronic   • Fall    • Fracture 1977    Jaw - lower condyle fracture   • GERD (gastroesophageal reflux disease)    • Hyperlipidemia    • IBD (inflammatory bowel disease)      Code Status: Full code    Hospital Summary:       Please refer to H&P by Dr. Kumar on 8/16/18 for full details.      In brief, Jayesh Kessler is a 73 y.o. male who was transferred from an outside facility on 8/15/2018 after having a syncopal episode at home followed by 1.5 hours of confusion and memory loss.  He was recently diagnosed with adrenal insufficiency and has been maintained on high dose steroids.  Other pertinent medical history includes bradycardia and hypotension (around 100 systolic) at baseline.  He is normally an active jerry and enjoys traveling, playing golf, and driving high performance cars. He and his wife live 6 months here and 6 months in [place].  He has been followed by a neurologist there, where they were working up Lewy Body dementia. His wife endorses some behavioral changes over the last 8 years.    His wife describes his syncopal episode as being unresponsive while \"zoning out\".  She said he never fell or actually lost consciousness, but was unable to be snapped out of his trance.  The patient does not remember the event or how he got to the hospital. His memory outside of " the event has slowly returned since then.  Workup was completed and revealed normal lab work, unremarkable MRI, negative carotid duplex, normal EEG, and normal echocardiogram.  PT/OT has cleared him.  He was tele monitored and had heart rates down into the 40s, but was asymptomatic. No arrhythmias were noted.  I discussed the case with cardiology, who recommended an outpatient event monitor.  Neurology was also consulted and recommended outpatient neuropsychologist evaluation, which has been discussed with the patient and his wife.  He had no further episodes during his hospitalization and is anxious to go home.  Follow up appointments have been arranged for him. A referral to outpatient geriatrics has also been made.    Therefore, he is discharged in good and stable condition with close outpatient follow-up.    Consultants:      Neurology    Studies:  Imaging/ Testing:      Echocardiogram Comp W/O cont   Final Result      Carotid Duplex ([facility] Only) - Do not order if CTA - Neck done in ER         MR-BRAIN-W/O   Final Result      1.  No acute abnormality.   2.  Mild cerebral atrophy.   3.  A few nonspecific T2 hyperintensities in the subcortical and periventricular white matter likely representing nonspecific foci of gliosis.        Laboratory:   Recent Labs      08/16/18 0414   WBC  8.7   RBC  4.59*   HEMOGLOBIN  14.5   HEMATOCRIT  42.4   MCV  92.4   MCH  31.6   MCHC  34.2   RDW  42.9   PLATELETCT  173   MPV  9.3     Recent Labs      08/16/18 0414   SODIUM  141   POTASSIUM  3.7   CHLORIDE  108   CO2  23   GLUCOSE  81   BUN  15   CREATININE  0.93   CALCIUM  8.8     Recent Labs      08/16/18 0414   ALTSGPT  11   ASTSGOT  17   ALKPHOSPHAT  20*   TBILIRUBIN  0.4   GLUCOSE  81      Recent Labs      08/16/18 0414   TRIGLYCERIDE  140   HDL  57   LDL  46     Recent Labs      08/16/18 0414   TSHULTRASEN  2.560     Procedures/Surgeries:        None    Disposition:  Discharge home with close  outpatient follow up    Condition:  Stable    Instructions:   Activity: As tolerated. Exercise encouraged.  Diet: Resume previous diet  Followup: PCP in 1-2 weeks, neurology at first available, neuropsychology, geriatrics  Instructions:  -NO DRIVING UNTIL CLEARED BY NEUROLOGY AND/OR PCP (This was discussed with the patient and his wife.  He does not have a [place] drivers license, but they understand that driving could potentially harm him and those around him, and intend on complying with the recommendation of no driving).   -Given instructions to return to the ER if any new or worsening symptoms, worsening condition, or failure to improve  -Ask your PCP for a referral to neuropsychology  -No smoking, no alcohol, no caffeine  -Encourage risk factor reduction with tobacco and alcohol abstinence, diet changes, weight loss, and exercise.     Follow-Up  Future Appointments  Date Time Provider Department Center   8/20/2018 9:00 AM [facility] [address]   8/23/2018 3:00 PM Farrah Bangura M.D. [address]   9/17/2018 10:00 AM [facility] None     Discharge Medications:        (YES)  Medication Reconciliation Completed     Medication List      CONTINUE taking these medications      Instructions   artificial tear ointment Oint  Commonly known as:  REFRESH,LACRI-LUBE   Place 2 Applications in both eyes 2 Times a Day.  Dose:  2 Application     aspirin 81 MG tablet   Take 81 mg by mouth every day.  Dose:  81 mg     CLONAZEPAM PO   Take  by mouth.     D3 ADULT PO   Take  by mouth.     ESOMEPRAZOLE MAGNESIUM PO   Take 40 mg by mouth 2 Times a Day.  Dose:  40 mg     ezetimibe 10 MG Tabs  Commonly known as:  ZETIA   Take 10 mg by mouth every day.  Dose:  10 mg     hydrocortisone 10 MG Tabs  Commonly known as:  CORTEF   Take 5 mg by mouth every day.  Dose:  5 mg     ipratropium 0.03 % Soln  Commonly known as:  ATROVENT   Spray 2 Sprays in nose every bedtime.  Dose:  2 Spray      levalbuterol 0.63 MG/3ML Nebu  Commonly known as:  XOPENEX   0.63 mg by Nebulization route every four hours as needed for Shortness of Breath.  Dose:  0.63 mg     levalbuterol 45 MCG/ACT inhaler  Commonly known as:  XOPENEX HFA   Inhale 1-2 Puffs by mouth every four hours as needed for Shortness of Breath.  Dose:  1-2 Puff     Melatonin 5 MG Tabs   Take 5 mg by mouth every bedtime.  Dose:  5 mg     MIRALAX PO   Take  by mouth.     NIACIN PO   Take  by mouth.     NUCALA SC   Inject  as instructed Q 4 Weeks.     predniSONE 10 MG Tabs  Commonly known as:  DELTASONE   Take 8 mg by mouth every day.  Dose:  8 mg          Opioid prescription history checked: N/A    Total time of the discharge process exceeds 45 minutes. This included face to face with the patient, medication reconciliation, care coordination with Dr. Nieto, Dr. Castañeda, & Dr. Hathaway involved in patient care and discussion and coordination with case management.     Please CC the above physicians    CHELSEA Peña  8/17/2018  9:23 PM

## 2018-08-20 ENCOUNTER — PATIENT OUTREACH (OUTPATIENT)
Dept: HEALTH INFORMATION MANAGEMENT | Facility: OTHER | Age: 73
End: 2018-08-20

## 2018-08-20 NOTE — PROGRESS NOTES
8/20/18 8:50am:  CM post discharge outreach call first attempt.   left requesting return call to  at [phone].

## 2018-08-21 ENCOUNTER — PATIENT OUTREACH (OUTPATIENT)
Dept: HEALTH INFORMATION MANAGEMENT | Facility: OTHER | Age: 73
End: 2018-08-21

## 2018-08-21 ENCOUNTER — TELEPHONE (OUTPATIENT)
Dept: HEALTH INFORMATION MANAGEMENT | Facility: OTHER | Age: 73
End: 2018-08-21

## 2018-08-21 NOTE — TELEPHONE ENCOUNTER
CM returned patient call.  Pt states he thought appointment on 8/23/18 with Dr. Bangura was neurology appointment.  CM reviewed pt appointment for 8/23/18 was with Dr. Bangura for post hospital follow-up.  Patient states he will discuss with Dr. Bangura request for referral to neurology. Pt has not other questions or concerns at this time. Pt aware of appt date and time at post discharge clinic.

## 2018-08-23 ENCOUNTER — OFFICE VISIT (OUTPATIENT)
Dept: MEDICAL GROUP | Facility: MEDICAL CENTER | Age: 73
End: 2018-08-23
Payer: MEDICARE

## 2018-08-23 VITALS
SYSTOLIC BLOOD PRESSURE: 106 MMHG | HEART RATE: 67 BPM | TEMPERATURE: 98.5 F | WEIGHT: 161.4 LBS | HEIGHT: 70 IN | OXYGEN SATURATION: 95 % | DIASTOLIC BLOOD PRESSURE: 60 MMHG | BODY MASS INDEX: 23.11 KG/M2 | RESPIRATION RATE: 14 BRPM

## 2018-08-23 DIAGNOSIS — Z09 HOSPITAL DISCHARGE FOLLOW-UP: ICD-10-CM

## 2018-08-23 DIAGNOSIS — E27.40 ADRENAL INSUFFICIENCY (HCC): ICD-10-CM

## 2018-08-23 DIAGNOSIS — R55 SYNCOPE, UNSPECIFIED SYNCOPE TYPE: ICD-10-CM

## 2018-08-23 DIAGNOSIS — R00.1 SINUS BRADYCARDIA: ICD-10-CM

## 2018-08-23 PROCEDURE — 99496 TRANSJ CARE MGMT HIGH F2F 7D: CPT | Performed by: FAMILY MEDICINE

## 2018-08-23 NOTE — PATIENT INSTRUCTIONS
If you need further assistance, or have any questions; concerns or lingering symptoms before seeing your Primary Care Provider or specialist.     Do not hesitate to contact us.     Please contact us at the Post-Hospital Follow Up Program at [phone] and ask for the Medical Assistant for Dr Bangura.   Our offices hours are Monday-Friday 8 am-5 pm.

## 2018-08-24 NOTE — PROGRESS NOTES
Subjective:     Jayesh Kessler is a 73 y.o. male who presents for Hospital Follow-up.  Chart and discharge summary reviewed.   Date of discharge 8/17/18.  48- hour post discharge RN call completed on 8/20/18 and documented in the medical record by Romina Cummins RN:   POST DISCHARGE CALL:  Discharge Date:8/17/2018   Date of Outreach Call: 8/20/2018  2:18 PM  Now that you're home, how are you doing? Good  Comment:Pt states he is feeling fine.  Reports no  further symptoms. Pt understands he is not to drive and will  discuss with PCP.  Do you have questions about your medications? No    Did you fill your medications? No    Do you have a follow-up appointment scheduled?Yes  Comment:Post discharge clinic on 8/23/18    Discharging Department: Neurosciences    Number of Attempts: 2  Current or previous attempts completed within two business days of discharge? Yes  Provided education regarding treatment plan, medication, self-management, ADLs? Yes  Has patient completed Advance Directive? If yes, advise them to bring to appointment. No  Care Manager phone number provided? Yes  Is there anything else I can help you with? No        HPI: Recently hospitalized for an atypical syncopal episode in which he lost awareness of his surroundings for 1-1/2 to 2 hours.  His wife suspects an episode of transient global amnesia.  His history is significant for severe asthma requiring chronic prednisone.  This led to a diagnosis of adrenal insufficiency.  He and his wife live in [place] for 6 months out of the year and [place], [place] 6 months out of the year.  The patient has a neurologist and PCP in [place].  He is typically an extremely active person.  He has a cardiologist, Dr. Leung, in [place].  In the past he has also functioned as a PCP but apparently has encouraged the patient to find another doctor for a PCP.  Workup included MRI, carotid duplex studies, EEG, echocardiogram.  All of these were normal.  On  "telemetry it was noted that his heart rate often went down into the 40s.  He was asymptomatic.  He says that his heart rate has been in the 40s for many years.    Since returning home, patient reports feeling good.     The patient has questions regarding hospitalization or discharge: Many questions answered.  The patient denies weakness; denies difficulty taking care of self at home.  Patient reports taking medications as instructed.      Allergies:   Avelox [moxifloxacin hcl in nacl]; Statins [hmg-coa-r inhibitors]; and Talwin    Social History:  Social History   Substance Use Topics   • Smoking status: Current Every Day Smoker     Packs/day: 1.00     Years: 20.00   • Smokeless tobacco: Never Used   • Alcohol use No        ROS:    Constitutional:  Denies fever, chills, night sweats, fatigue or malaise  HENT: Denies head congestion, ear pain or drainage, decreased hearing, sore throat  Eyes: Denies visual changes, eye drainage or redness, eye pain  Neck: Denies pain, swollen glands, decreased range of motion  Lungs: Denies shortness of breath, wheezing, cough  Cardiovascular: Denies chest pain, orthopnea, lower extremity edema, palpitations  Abdominal: Denies abdominal pain, change in bowel or bladder habits, nausea or vomiting  Musculoskeletal: Denies back pain, joint pains, decreased range of motion  Endocrine: Denies unexplained weight changes, heat or cold intolerance, hair loss, polyuria or polydipsia  Neurological: Denies dizziness, headache, confusion, focal weakness or numbness, memory loss  Psychiatric: Denies depression, anxiety, insomnia       Objective:     Blood pressure 106/60, pulse 67, temperature 36.9 °C (98.5 °F), resp. rate 14, height 1.778 m (5' 10\"), weight 73.2 kg (161 lb 6.4 oz), SpO2 95 %.     Physical Exam:    GEN:  Alert, oriented, in no distress  HEENT:  PERRLA, EOMI  NECK;  Supple without adenopathy  LUNGS:  Clear to auscultation without rales, rhonci, or wheezes.  CV:  Heart RRR without " murmurs or S3 or S4  EXT:  Without cyanosis, clubbing, or edema.  NEURO:  Cranial nerves II through XII intact.  Motor function and sensation grossly intact.  SKIN: No rashes or suspicious lesions.  PSYCH:  Behavior is appropriate.      Assessment and Plan:     1. Hospital follow-up:   Hospitalization and results reviewed with patient. High risk conditions requiring teaching or care coordination were identified and addressed.The patient demonstrate understanding of admission and underlying conditions. The patient understands discharge instructions and when to seek medical attention. Medications reviewed including instructions regarding high risk medications, dosing and side effects.    The patient is able to safely adhere to ADL/IADL, treatment and medication regimen, self-manage of high-risk conditions? Yes  The patient requires physical therapy/home health/DME referral? No   The patient requires referral to care coordination/behavioral health/social work?  No   Patient requires referral for pharmacy consult? No   Advance directive/POLST on file?  No   Required counseling on advance directive?  Deferred     2. Atypical syncope, unspecified syncope type    - REFERRAL TO NEUROLOGY    3. Sinus bradycardia episodes in hospital  -I recommended that he discuss with Dr. Leung the option of an implantable loop recorder to rule out sick sinus syndrome.    4. Adrenal insufficiency, chronic  -Underwent a short course of Cortef.  -He is continuing long term prednisone out of necessity. He notes that previous trials to get off of it have failed.        Medication Reconciliation  Medication list at end of encounter:   Current Outpatient Prescriptions   Medication Sig Dispense Refill   • ESOMEPRAZOLE MAGNESIUM PO Take 40 mg by mouth 2 Times a Day.     • artificial tear ointment (REFRESH,LACRI-LUBE) Ointment Place 2 Applications in both eyes 2 Times a Day.     • Mepolizumab (NUCALA SC) Inject  as instructed Q 4 Weeks.     •  ipratropium (ATROVENT) 0.03 % Solution Spray 2 Sprays in nose every bedtime.     • ezetimibe (ZETIA) 10 MG Tab Take 10 mg by mouth every day.     • NIACIN PO Take  by mouth.     • aspirin 81 MG tablet Take 81 mg by mouth every day.     • CLONAZEPAM PO Take  by mouth.     • predniSONE (DELTASONE) 10 MG Tab Take 8 mg by mouth every day.     • levalbuterol (XOPENEX) 0.63 MG/3ML Nebu Soln 0.63 mg by Nebulization route every four hours as needed for Shortness of Breath.     • levalbuterol (XOPENEX HFA) 45 MCG/ACT inhaler Inhale 1-2 Puffs by mouth every four hours as needed for Shortness of Breath.     • Cholecalciferol (D3 ADULT PO) Take  by mouth.     • Melatonin 5 MG Tab Take 5 mg by mouth every bedtime.     • Polyethylene Glycol 3350 (MIRALAX PO) Take  by mouth.       No current facility-administered medications for this visit.        Primary care follow-up:    He wishes to follow up with Dr. Leung.    Future Appointments       Provider Department Center    9/17/2018 10:00 AM [facility]           Patient Instruction  Patient provided with educational material on discharge diagnosis and management of symptoms/red flags. Patient instructed to keep follow-up appointments and to bring written questions and and actual medications to each office visit. Patient instructed to call PCP/specialist with any problems/questions/concerns. Patient verbalizes understanding and has no further questions at this time.    Face-to-face transitional care management services with high medical decision complexity were provided.      CC:  Jesus Leung M.D., JELENA Solis M.D., [place], [place]

## 2018-08-30 ENCOUNTER — PATIENT OUTREACH (OUTPATIENT)
Dept: HEALTH INFORMATION MANAGEMENT | Facility: OTHER | Age: 73
End: 2018-08-30

## 2018-08-30 NOTE — PROGRESS NOTES
Pt had my number from last phone call and wanted the number to Dr. Castañeda that saw him in the hospital and put a restriction on my drivers license. Number given out of the chart and also Romina JUNG Number of [id number].

## 2018-09-22 ENCOUNTER — APPOINTMENT (OUTPATIENT)
Dept: RADIOLOGY | Facility: MEDICAL CENTER | Age: 73
End: 2018-09-22
Attending: EMERGENCY MEDICINE
Payer: MEDICARE

## 2018-09-22 ENCOUNTER — HOSPITAL ENCOUNTER (OUTPATIENT)
Facility: MEDICAL CENTER | Age: 73
End: 2018-09-23
Attending: EMERGENCY MEDICINE | Admitting: INTERNAL MEDICINE
Payer: MEDICARE

## 2018-09-22 PROBLEM — R07.9 CHEST PAIN: Status: ACTIVE | Noted: 2018-09-22

## 2018-09-22 LAB
ALBUMIN SERPL BCP-MCNC: 3.9 G/DL (ref 3.2–4.9)
ALBUMIN/GLOB SERPL: 1.7 G/DL
ALP SERPL-CCNC: 22 U/L (ref 30–99)
ALT SERPL-CCNC: 15 U/L (ref 2–50)
ANION GAP SERPL CALC-SCNC: 7 MMOL/L (ref 0–11.9)
APTT PPP: 26.2 SEC (ref 24.7–36)
AST SERPL-CCNC: 23 U/L (ref 12–45)
BASOPHILS # BLD AUTO: 0.3 % (ref 0–1.8)
BASOPHILS # BLD: 0.03 K/UL (ref 0–0.12)
BILIRUB SERPL-MCNC: 0.6 MG/DL (ref 0.1–1.5)
BNP SERPL-MCNC: 43 PG/ML (ref 0–100)
BUN SERPL-MCNC: 12 MG/DL (ref 8–22)
CALCIUM SERPL-MCNC: 9.1 MG/DL (ref 8.4–10.2)
CHLORIDE SERPL-SCNC: 106 MMOL/L (ref 96–112)
CO2 SERPL-SCNC: 24 MMOL/L (ref 20–33)
CREAT SERPL-MCNC: 1.07 MG/DL (ref 0.5–1.4)
EKG IMPRESSION: NORMAL
EOSINOPHIL # BLD AUTO: 0 K/UL (ref 0–0.51)
EOSINOPHIL NFR BLD: 0 % (ref 0–6.9)
ERYTHROCYTE [DISTWIDTH] IN BLOOD BY AUTOMATED COUNT: 43.4 FL (ref 35.9–50)
GLOBULIN SER CALC-MCNC: 2.3 G/DL (ref 1.9–3.5)
GLUCOSE SERPL-MCNC: 100 MG/DL (ref 65–99)
HCT VFR BLD AUTO: 43.7 % (ref 42–52)
HGB BLD-MCNC: 14.9 G/DL (ref 14–18)
IMM GRANULOCYTES # BLD AUTO: 0.03 K/UL (ref 0–0.11)
IMM GRANULOCYTES NFR BLD AUTO: 0.3 % (ref 0–0.9)
INR PPP: 1.08 (ref 0.87–1.13)
LIPASE SERPL-CCNC: 27 U/L (ref 7–58)
LYMPHOCYTES # BLD AUTO: 1.07 K/UL (ref 1–4.8)
LYMPHOCYTES NFR BLD: 10.5 % (ref 22–41)
MCH RBC QN AUTO: 31.4 PG (ref 27–33)
MCHC RBC AUTO-ENTMCNC: 34.1 G/DL (ref 33.7–35.3)
MCV RBC AUTO: 92 FL (ref 81.4–97.8)
MONOCYTES # BLD AUTO: 0.89 K/UL (ref 0–0.85)
MONOCYTES NFR BLD AUTO: 8.7 % (ref 0–13.4)
NEUTROPHILS # BLD AUTO: 8.21 K/UL (ref 1.82–7.42)
NEUTROPHILS NFR BLD: 80.2 % (ref 44–72)
NRBC # BLD AUTO: 0 K/UL
NRBC BLD-RTO: 0 /100 WBC
PLATELET # BLD AUTO: 168 K/UL (ref 164–446)
PMV BLD AUTO: 9.3 FL (ref 9–12.9)
POTASSIUM SERPL-SCNC: 3.7 MMOL/L (ref 3.6–5.5)
PROT SERPL-MCNC: 6.2 G/DL (ref 6–8.2)
PROTHROMBIN TIME: 13.9 SEC (ref 12–14.6)
RBC # BLD AUTO: 4.75 M/UL (ref 4.7–6.1)
SODIUM SERPL-SCNC: 137 MMOL/L (ref 135–145)
TROPONIN I SERPL-MCNC: <0.02 NG/ML (ref 0–0.04)
TROPONIN I SERPL-MCNC: <0.02 NG/ML (ref 0–0.04)
WBC # BLD AUTO: 10.2 K/UL (ref 4.8–10.8)

## 2018-09-22 PROCEDURE — 80053 COMPREHEN METABOLIC PANEL: CPT

## 2018-09-22 PROCEDURE — G0378 HOSPITAL OBSERVATION PER HR: HCPCS

## 2018-09-22 PROCEDURE — 85730 THROMBOPLASTIN TIME PARTIAL: CPT

## 2018-09-22 PROCEDURE — 99219 PR INITIAL OBSERVATION CARE,LEVL II: CPT | Performed by: INTERNAL MEDICINE

## 2018-09-22 PROCEDURE — 83880 ASSAY OF NATRIURETIC PEPTIDE: CPT

## 2018-09-22 PROCEDURE — A9270 NON-COVERED ITEM OR SERVICE: HCPCS | Performed by: INTERNAL MEDICINE

## 2018-09-22 PROCEDURE — 36415 COLL VENOUS BLD VENIPUNCTURE: CPT

## 2018-09-22 PROCEDURE — 99285 EMERGENCY DEPT VISIT HI MDM: CPT

## 2018-09-22 PROCEDURE — 93005 ELECTROCARDIOGRAM TRACING: CPT | Performed by: EMERGENCY MEDICINE

## 2018-09-22 PROCEDURE — 71045 X-RAY EXAM CHEST 1 VIEW: CPT

## 2018-09-22 PROCEDURE — 700102 HCHG RX REV CODE 250 W/ 637 OVERRIDE(OP): Performed by: INTERNAL MEDICINE

## 2018-09-22 PROCEDURE — 85025 COMPLETE CBC W/AUTO DIFF WBC: CPT

## 2018-09-22 PROCEDURE — 84484 ASSAY OF TROPONIN QUANT: CPT

## 2018-09-22 PROCEDURE — 85610 PROTHROMBIN TIME: CPT

## 2018-09-22 PROCEDURE — 83690 ASSAY OF LIPASE: CPT

## 2018-09-22 RX ORDER — POLYETHYLENE GLYCOL 3350 17 G/17G
1 POWDER, FOR SOLUTION ORAL
Status: DISCONTINUED | OUTPATIENT
Start: 2018-09-22 | End: 2018-09-23 | Stop reason: HOSPADM

## 2018-09-22 RX ORDER — NIACIN 500 MG/1
1000 TABLET, EXTENDED RELEASE ORAL EVERY EVENING
Status: DISCONTINUED | OUTPATIENT
Start: 2018-09-22 | End: 2018-09-23 | Stop reason: HOSPADM

## 2018-09-22 RX ORDER — LEVALBUTEROL TARTRATE 45 UG/1
2 AEROSOL, METERED ORAL EVERY 6 HOURS PRN
COMMUNITY

## 2018-09-22 RX ORDER — NITROGLYCERIN 0.4 MG/1
0.4 TABLET SUBLINGUAL
Status: DISCONTINUED | OUTPATIENT
Start: 2018-09-22 | End: 2018-09-23 | Stop reason: HOSPADM

## 2018-09-22 RX ORDER — ACETAMINOPHEN 325 MG/1
650 TABLET ORAL EVERY 6 HOURS PRN
Status: DISCONTINUED | OUTPATIENT
Start: 2018-09-22 | End: 2018-09-23 | Stop reason: HOSPADM

## 2018-09-22 RX ORDER — ONDANSETRON 2 MG/ML
4 INJECTION INTRAMUSCULAR; INTRAVENOUS EVERY 4 HOURS PRN
Status: DISCONTINUED | OUTPATIENT
Start: 2018-09-22 | End: 2018-09-23 | Stop reason: HOSPADM

## 2018-09-22 RX ORDER — POLYETHYLENE GLYCOL 3350 17 G/17G
17 POWDER, FOR SOLUTION ORAL
COMMUNITY

## 2018-09-22 RX ORDER — CLONAZEPAM 1 MG/1
1 TABLET ORAL
COMMUNITY

## 2018-09-22 RX ORDER — ASPIRIN 81 MG/1
324 TABLET, CHEWABLE ORAL DAILY
Status: DISCONTINUED | OUTPATIENT
Start: 2018-09-22 | End: 2018-09-23 | Stop reason: HOSPADM

## 2018-09-22 RX ORDER — AMOXICILLIN 250 MG
2 CAPSULE ORAL 2 TIMES DAILY
Status: DISCONTINUED | OUTPATIENT
Start: 2018-09-22 | End: 2018-09-23 | Stop reason: HOSPADM

## 2018-09-22 RX ORDER — CLONAZEPAM 0.5 MG/1
1 TABLET ORAL
Status: DISCONTINUED | OUTPATIENT
Start: 2018-09-22 | End: 2018-09-23 | Stop reason: HOSPADM

## 2018-09-22 RX ORDER — ONDANSETRON 4 MG/1
4 TABLET, ORALLY DISINTEGRATING ORAL EVERY 4 HOURS PRN
Status: DISCONTINUED | OUTPATIENT
Start: 2018-09-22 | End: 2018-09-23 | Stop reason: HOSPADM

## 2018-09-22 RX ORDER — CHOLECALCIFEROL (VITAMIN D3) 125 MCG
6 CAPSULE ORAL
COMMUNITY

## 2018-09-22 RX ORDER — ESOMEPRAZOLE MAGNESIUM 40 MG/1
40 CAPSULE, DELAYED RELEASE ORAL 2 TIMES DAILY
COMMUNITY

## 2018-09-22 RX ORDER — ASPIRIN 600 MG/1
300 SUPPOSITORY RECTAL DAILY
Status: DISCONTINUED | OUTPATIENT
Start: 2018-09-22 | End: 2018-09-23 | Stop reason: HOSPADM

## 2018-09-22 RX ORDER — BISACODYL 10 MG
10 SUPPOSITORY, RECTAL RECTAL
Status: DISCONTINUED | OUTPATIENT
Start: 2018-09-22 | End: 2018-09-23 | Stop reason: HOSPADM

## 2018-09-22 RX ORDER — ASPIRIN 325 MG
325 TABLET ORAL DAILY
Status: DISCONTINUED | OUTPATIENT
Start: 2018-09-22 | End: 2018-09-23 | Stop reason: HOSPADM

## 2018-09-22 RX ORDER — ESOMEPRAZOLE MAGNESIUM 40 MG/1
40 CAPSULE, DELAYED RELEASE ORAL 2 TIMES DAILY
Status: DISCONTINUED | OUTPATIENT
Start: 2018-09-22 | End: 2018-09-23 | Stop reason: HOSPADM

## 2018-09-22 RX ORDER — IPRATROPIUM BROMIDE 21 UG/1
2 SPRAY, METERED NASAL
COMMUNITY
End: 2018-09-25

## 2018-09-22 RX ORDER — PREDNISONE 1 MG/1
8 TABLET ORAL DAILY
COMMUNITY

## 2018-09-22 RX ORDER — EZETIMIBE 10 MG/1
10 TABLET ORAL EVERY EVENING
Status: DISCONTINUED | OUTPATIENT
Start: 2018-09-22 | End: 2018-09-23 | Stop reason: HOSPADM

## 2018-09-22 RX ORDER — IPRATROPIUM BROMIDE 21 UG/1
2 SPRAY, METERED NASAL
Status: DISCONTINUED | OUTPATIENT
Start: 2018-09-22 | End: 2018-09-23 | Stop reason: HOSPADM

## 2018-09-22 RX ORDER — NIACIN 1000 MG
1000 TABLET, EXTENDED RELEASE ORAL EVERY EVENING
COMMUNITY
End: 2018-09-25

## 2018-09-22 RX ADMIN — LIDOCAINE HYDROCHLORIDE 15 ML: 20 SOLUTION OROPHARYNGEAL at 13:57

## 2018-09-22 RX ADMIN — STANDARDIZED SENNA CONCENTRATE AND DOCUSATE SODIUM 2 TABLET: 8.6; 5 TABLET, FILM COATED ORAL at 17:04

## 2018-09-22 ASSESSMENT — ENCOUNTER SYMPTOMS
FEVER: 0
DIZZINESS: 0
COUGH: 0
FALLS: 0
MYALGIAS: 0
WEAKNESS: 0
PALPITATIONS: 0
CONSTIPATION: 0
SHORTNESS OF BREATH: 0
DEPRESSION: 0
NAUSEA: 0
ABDOMINAL PAIN: 0
HEADACHES: 0
TINGLING: 0
CHILLS: 0
VOMITING: 0
LOSS OF CONSCIOUSNESS: 0
STRIDOR: 0
SPUTUM PRODUCTION: 0
DIARRHEA: 0

## 2018-09-22 ASSESSMENT — COGNITIVE AND FUNCTIONAL STATUS - GENERAL
DAILY ACTIVITIY SCORE: 24
MOBILITY SCORE: 24
SUGGESTED CMS G CODE MODIFIER DAILY ACTIVITY: CH
SUGGESTED CMS G CODE MODIFIER MOBILITY: CH

## 2018-09-22 ASSESSMENT — PAIN SCALES - GENERAL
PAINLEVEL_OUTOF10: 0

## 2018-09-22 ASSESSMENT — PATIENT HEALTH QUESTIONNAIRE - PHQ9
1. LITTLE INTEREST OR PLEASURE IN DOING THINGS: NOT AT ALL
SUM OF ALL RESPONSES TO PHQ9 QUESTIONS 1 AND 2: 0
2. FEELING DOWN, DEPRESSED, IRRITABLE, OR HOPELESS: NOT AT ALL

## 2018-09-22 ASSESSMENT — LIFESTYLE VARIABLES
ALCOHOL_USE: NO
EVER_SMOKED: YES

## 2018-09-22 NOTE — ASSESSMENT & PLAN NOTE
-Trend troponins, monitor on telemetry and repeat EKG  -If no concerning trend, obtain stress test, patient states he is able to ambulate on treadmill to stress  -While patient does have esophageal spasm, did just have treatment, has never had pain with them, do not think this is the diagnosis, do not feel calcium channel blocker is needed at this time  -Likely due to stress  -Symptomatic care

## 2018-09-22 NOTE — ED NOTES
Pt transported upstairs via gurney on monitor by ER Tech/RN with all belongings. Bedside report to be given upon arrival.

## 2018-09-22 NOTE — ASSESSMENT & PLAN NOTE
-During the exam most of the time it was normal however there was occasional runs when he was in the upper 50s  -Apparently has dropped down into the 40s whenever he had the event monitor  -Outpatient follow-up with cardiology and [place]

## 2018-09-22 NOTE — ED TRIAGE NOTES
Chief Complaint   Patient presents with   • Chest Pain     Pt c/o CP onset this am 0405. Pt states CP woke him from sleep.      /72   Pulse 61   Temp 36.4 °C (97.6 °F)   Resp 16   Wt 73.4 kg (161 lb 13.1 oz)   SpO2 97%   BMI 23.22 kg/m²

## 2018-09-22 NOTE — ED NOTES
The Medication Reconciliation process has been completed by interviewing the patient who had an EGD this week and received botox injections this week into his esophagus.     Allergies have been reviewed  Antibiotic use in 30 days - none    Home Pharmacy:  [facility]

## 2018-09-22 NOTE — ED PROVIDER NOTES
ED Provider Note  CHIEF COMPLAINT  Chief Complaint   Patient presents with   • Chest Pain     Pt c/o CP onset this am 0405. Pt states CP woke him from sleep.        HPI  Jayesh Kessler is a 73 y.o. male who presents to the emergency department complaining of chest pain.  He states that this morning at approximately 4 AM he started having chest pain  in [place].  He was therefore extensive medical evaluations for his esophageal asthma.  The patient states that he recently was diagnosed with transient global amnesia and had a complete workup.  In addition he states that he has had a cardiac event monitor evaluation by Dr. Walker in [facility] without significant abnormality although they did state that he may need a pacemaker.  The patient's pain is dull in nature and was 7/10 in the center of his chest and felt like someone is sitting on his chest or pressing a plate on his chest.  There is no radiation to his neck, to his back, to his jaw, increased with walking and pulling his baggage and decreased with rest.  He states that he did get on the plane and the pain was fluctuating throughout the flight here to [facility].  While here at the airport, he was pulling his bag and states that he started having intermittent chest discomfort that increased with movement and walking and decreases with rest.  He did take an aspirin tab this morning.  He states that he has a jaw fracture in the past resulting in a slight left sided facial droop.  Cardiac Risk Factors:  No Age > 65  No Aspirin use within 7 days  No prior history of coronary artery disease  No diabetes  Yes hyperlipidemia   No hypertension  No obesity  No family history of coronary artery disease at a young age <54 yo  No tobacco use   No drugs (methamphetamine or cocaine)  No history of aortic aneurysm   No history of aortic dissection   No history of deep vein thrombosis or pulmonary embolism       REVIEW OF SYSTEMS  Positives as above. Pertinent  negatives include fever, nausea, vomiting, loss of sensation or strength in arms or legs, swelling of his lower extremities  All other review of systems are negative    PAST MEDICAL HISTORY  Past Medical History:   Diagnosis Date   • Allergy    • Asthma    • Back pain     chronic   • Fall    • Fracture 1977    Jaw - lower condyle fracture   • GERD (gastroesophageal reflux disease)    • Hyperlipidemia    • IBD (inflammatory bowel disease)        FAMILY HISTORY  Noncontributory    SOCIAL HISTORY  Social History     Social History   • Marital status:      Spouse name: N/A   • Number of children: N/A   • Years of education: N/A     Social History Main Topics   • Smoking status: Current Every Day Smoker     Packs/day: 1.00     Years: 20.00   • Smokeless tobacco: Never Used   • Alcohol use No   • Drug use: No   • Sexual activity: Not on file     Other Topics Concern   • Not on file     Social History Narrative   • No narrative on file       SURGICAL HISTORY  Past Surgical History:   Procedure Laterality Date   • OTHER  2015    Removal of large polyp and 2 med sized polyps   • OTHER Left 1980    Left jaw surgery following surgery   • LYMPH NODE EXCISION  1976   • OTHER      Jaw Surgery   • OTHER ORTHOPEDIC SURGERY Left     Left jaw surgery after injury   • TONSILLECTOMY         CURRENT MEDICATIONS  Home Medications     Reviewed by Alo Magana (Pharmacy Tech) on 09/22/18 at 1204  Med List Status: Complete   Medication Last Dose Status   artificial tear ointment (REFRESH,LACRI-LUBE) Ointment <2 weeks Active   aspirin 81 MG tablet 9/22/2018 Active   Cholecalciferol 5000 units Tab 9/22/2018 Active   clonazePAM (KLONOPIN) 1 MG Tab 9/21/2018 Active   esomeprazole (NEXIUM) 40 MG delayed-release capsule 9/21/2018 Active   ezetimibe (ZETIA) 10 MG Tab 9/21/2018 Active   ipratropium (ATROVENT) 0.03 % Solution 9/21/2018 Active   levalbuterol (XOPENEX HFA) 45 MCG/ACT inhaler 9/22/2018 Active   Melatonin 5 MG Tab  "9/21/2018 Active   Mepolizumab (NUCALA SC) 9/10/2018 Active   Niacin CR 1000 MG Tab CR 9/20/2018 Active   polyethylene glycol/lytes (MIRALAX) Pack 9/21/2018 Active   predniSONE (DELTASONE) 1 MG Tab 9/22/2018 Active   TESTOSTERONE IM 5/8/2018 Active                ALLERGIES  Allergies   Allergen Reactions   • Avelox [Moxifloxacin Hcl In Nacl]      Inability to function - muscle pain   • Statins [Hmg-Coa-R Inhibitors]      Muscle and Joint Pain   • Talwin Nausea       PHYSICAL EXAM  VITAL SIGNS: /75   Pulse 60   Temp 36.9 °C (98.5 °F)   Resp 18   Ht 1.778 m (5' 10\")   Wt 73.1 kg (161 lb 2.5 oz)   SpO2 96%   BMI 23.12 kg/m²      Constitutional: Well developed, Well nourished, No acute distress, Non-toxic appearance.   Eyes: PERRLA, EOMI, Conjunctiva normal, No discharge.   Cardiovascular: Normal heart rate, Normal rhythm, No murmurs, No rubs, No gallops, and intact distal pulses.   Thorax & Lungs:  No respiratory distress, no rales, no rhonchi, No wheezing, No chest wall tenderness.   Abdomen: Bowel sounds normal, Soft, No tenderness, No guarding, No rebound, No pulsatile masses.   Skin: Warm, Dry, No erythema, No rash.   Extremities: Full range of motion, no deformity, no edema.  Neurologic: Alert & oriented x 3, slight left-sided chin/facial droop no focal deficits noted, acting appropriately on exam.  Psychiatric: Affect normal for clinical presentation.      RADIOLOGY/PROCEDURES  DX-CHEST-PORTABLE (1 VIEW)   Final Result      Mild hypoinflation with minimal LEFT basilar atelectasis.      NM-CARDIAC STRESS TEST    (Results Pending)     Results for orders placed or performed during the hospital encounter of 09/22/18   CBC w/ Differential   Result Value Ref Range    WBC 10.2 4.8 - 10.8 K/uL    RBC 4.75 4.70 - 6.10 M/uL    Hemoglobin 14.9 14.0 - 18.0 g/dL    Hematocrit 43.7 42.0 - 52.0 %    MCV 92.0 81.4 - 97.8 fL    MCH 31.4 27.0 - 33.0 pg    MCHC 34.1 33.7 - 35.3 g/dL    RDW 43.4 35.9 - 50.0 fL    " Platelet Count 168 164 - 446 K/uL    MPV 9.3 9.0 - 12.9 fL    Neutrophils-Polys 80.20 (H) 44.00 - 72.00 %    Lymphocytes 10.50 (L) 22.00 - 41.00 %    Monocytes 8.70 0.00 - 13.40 %    Eosinophils 0.00 0.00 - 6.90 %    Basophils 0.30 0.00 - 1.80 %    Immature Granulocytes 0.30 0.00 - 0.90 %    Nucleated RBC 0.00 /100 WBC    Neutrophils (Absolute) 8.21 (H) 1.82 - 7.42 K/uL    Lymphs (Absolute) 1.07 1.00 - 4.80 K/uL    Monos (Absolute) 0.89 (H) 0.00 - 0.85 K/uL    Eos (Absolute) 0.00 0.00 - 0.51 K/uL    Baso (Absolute) 0.03 0.00 - 0.12 K/uL    Immature Granulocytes (abs) 0.03 0.00 - 0.11 K/uL    NRBC (Absolute) 0.00 K/uL   Complete Metabolic Panel (CMP)   Result Value Ref Range    Sodium 137 135 - 145 mmol/L    Potassium 3.7 3.6 - 5.5 mmol/L    Chloride 106 96 - 112 mmol/L    Co2 24 20 - 33 mmol/L    Anion Gap 7.0 0.0 - 11.9    Glucose 100 (H) 65 - 99 mg/dL    Bun 12 8 - 22 mg/dL    Creatinine 1.07 0.50 - 1.40 mg/dL    Calcium 9.1 8.4 - 10.2 mg/dL    AST(SGOT) 23 12 - 45 U/L    ALT(SGPT) 15 2 - 50 U/L    Alkaline Phosphatase 22 (L) 30 - 99 U/L    Total Bilirubin 0.6 0.1 - 1.5 mg/dL    Albumin 3.9 3.2 - 4.9 g/dL    Total Protein 6.2 6.0 - 8.2 g/dL    Globulin 2.3 1.9 - 3.5 g/dL    A-G Ratio 1.7 g/dL   Troponin STAT   Result Value Ref Range    Troponin I <0.02 0.00 - 0.04 ng/mL   Btype Natriuretic Peptide   Result Value Ref Range    B Natriuretic Peptide 43 0 - 100 pg/mL   Lipase   Result Value Ref Range    Lipase 27 7 - 58 U/L   APTT   Result Value Ref Range    APTT 26.2 24.7 - 36.0 sec   PT/INR   Result Value Ref Range    PT 13.9 12.0 - 14.6 sec    INR 1.08 0.87 - 1.13   ESTIMATED GFR   Result Value Ref Range    GFR If African American >60 >60 mL/min/1.73 m 2    GFR If Non African American >60 >60 mL/min/1.73 m 2   Troponin in four (4) hours   Result Value Ref Range    Troponin I <0.02 0.00 - 0.04 ng/mL   EKG   Result Value Ref Range    Report       [facility] Emergency Dept.    Test Date:   2018  Pt Name:    LEELA SHIPLEY                 Department: [facility]  MRN:        [id number]                      [facility]  Gender:     Male                         Technician: [id number]  :        1945                   Requested By:TYRON MARTELL  Order #:    [id number]                    Reading MD: TYRON MARTELL DO    Measurements  Intervals                                Axis  Rate:       61                           P:          11  DC:         161                          QRS:        31  QRSD:       93                           T:          47  QT:         407  QTc:        410    Interpretive Statements  Sinus rhythm  Borderline ST elevation, anterior leads  No previous ECG available for comparison    Electronically Signed On 2018 11:15:34 PDT by TYRON MARTELL DO           COURSE & MEDICAL DECISION MAKING  Pertinent Labs & Imaging studies reviewed. (See chart for details)  This is a pleasant 73-year-old male presents with chest pain.  The patient has no evidence of ST elevation non-ST elevation myocardial infarction.  Here in the emergency department, the patient has no evidence of severe chest discomfort, no evidence of acute coronary syndrome.  Is not hypoxic, tachypneic or tachycardic and I do not believe he is experiencing a pulmonary embolism, aortic dissection or aortic aneurysm.  The patient will be admitted for further evaluation and management of chest pain.  Patient did take his aspirin prior to arrival to the hospital today.  I discussed the patient with Dr. Orozco for admission to the hospital.    Current Discharge Medication List          FINAL IMPRESSION  Chest pain         Electronically signed by: Tyron Martell, 2018 11:18 AM

## 2018-09-22 NOTE — H&P
"Hospital Medicine History & Physical Note    Date of Service  9/22/2018    Primary Care Physician  Pcp Pt States None    Consultants  None    Code Status  Full    Chief Complaint  Chest pain    History of Presenting Illness  73 y.o. male who presented 9/22/2018 with chest pain.  Patient states it started this morning at 4 in the morning, woke him up.  At that time he was in a hotel in [place].  Patient did not mention it to his wife, flew back here, did not want to miss his flight, upon arrival he did mention in the came to the emergency department.  Patient states it was substernal with slight radiation to the left chest, pressure-like sensation, 8/10 at its worst.  Patient states it resolved upon arrival to the emergency department.  Patient states he has had a similar pain years ago, due to increased stress.  Patient states he has had increased stress of late due to multiple hospital stays and concern for his health.  Patient recently has had an event monitor placed, was told he may need a pacemaker, apparently has episodes of bradycardia but additional arrhythmias that the family were not sure what type.  Patient also has \"esophageal asthma\", did have an EGD with Botox injections yesterday.  Patient has never had pain with any esophageal spasms.    Review of Systems  Review of Systems   Constitutional: Negative for chills, fever and malaise/fatigue.   HENT: Negative for congestion.    Respiratory: Negative for cough, sputum production, shortness of breath and stridor.    Cardiovascular: Positive for chest pain. Negative for palpitations and leg swelling.   Gastrointestinal: Negative for abdominal pain, constipation, diarrhea, nausea and vomiting.   Genitourinary: Negative for dysuria and urgency.   Musculoskeletal: Negative for falls and myalgias.   Neurological: Negative for dizziness, tingling, loss of consciousness, weakness and headaches.   Psychiatric/Behavioral: Negative for depression and suicidal ideas. "   All other systems reviewed and are negative.      Past Medical History   has a past medical history of Allergy; Asthma; Back pain; Fall; Fracture (1977); GERD (gastroesophageal reflux disease); Hyperlipidemia; and IBD (inflammatory bowel disease). He also has no past medical history of Addisons disease (HCC); Anemia; Anginal syndrome (HCC); Anxiety; Arrhythmia; Arthritis; At risk for sleep apnea; Blood clotting disorder (HCC); Blood transfusion without reported diagnosis; Bronchitis; Cancer (HCC); Cataract; CHF (congestive heart failure) (HCC); Clotting disorder (HCC); COPD (chronic obstructive pulmonary disease) (HCC); Cushings syndrome (HCC); Depression; Diabetes (HCC); Diabetic neuropathy (HCC); Dialysis patient ([facility]); Disorder of thyroid; Glaucoma; Goiter; Gynecological disorder; Headache(784.0); Heart attack (HCC); Heart murmur; Heart valve disease; Hemorrhagic disorder (HCC); HIV (human immunodeficiency virus infection) ([facility]); Hypertension; Indigestion; Infectious disease; Jaundice; Kidney disease; Meningitis; Migraine; Muscle disorder; Osteoporosis; Pacemaker; Parathyroid disorder (HCC); Pituitary disease (HCC); Pneumonia; Psychiatric problem; Pulmonary emphysema (HCC); Rheumatic fever; Seizure (HCC); Sickle cell disease (HCC); Stroke (HCC); Substance abuse; Tuberculosis; Ulcer; Urinary incontinence; or Urinary tract infection, site not specified.    Surgical History   has a past surgical history that includes other; tonsillectomy; lymph node excision (1976); other (2015); other orthopedic surgery (Left); and other (Left, 1980).     Family History  family history includes Allergies in his mother and sister; Arrythmia in his sister; Asthma in his mother; Cancer in his brother; Depression in his sister; Diabetes in his sister; Heart Attack in his brother, father, and sister; Heart Disease in his brother, father, and sister; Hyperlipidemia in his brother and father; Hypertension in his brother and father;  Kidney stones in his father; Stroke in his father.     Social History   reports that he has been smoking.  He has a 20.00 pack-year smoking history. He has never used smokeless tobacco. He reports that he does not drink alcohol or use drugs.    Allergies  Allergies   Allergen Reactions   • Avelox [Moxifloxacin Hcl In Nacl]      Inability to function - muscle pain   • Statins [Hmg-Coa-R Inhibitors]      Muscle and Joint Pain   • Talwin Nausea       Medications  Prior to Admission Medications   Prescriptions Last Dose Informant Patient Reported? Taking?   Cholecalciferol 5000 units Tab   Yes Yes   Sig: Take 5,000 Units by mouth every day.   Melatonin 5 MG Tab   Yes Yes   Sig: Take 5 mg by mouth every bedtime.   Mepolizumab (NUCALA SC) 9/10/2018 at   Yes No   Sig: Inject  as instructed Q 4 Weeks.   Niacin CR 1000 MG Tab CR 9/20/2018 at pm  Yes Yes   Sig: Take 1,000 mg by mouth every evening.   TESTOSTERONE IM 5/8/2018  Yes Yes   Sig: by Intramuscular route. Pellets implanted in to left hip   artificial tear ointment (REFRESH,LACRI-LUBE) Ointment <2 weeks at k  Yes No   Sig: Place 2 Applications in both eyes 2 Times a Day.   aspirin 81 MG tablet 9/22/2018 at am  Yes No   Sig: Take 81 mg by mouth every day.   clonazePAM (KLONOPIN) 1 MG Tab 9/21/2018 at hs  Yes Yes   Sig: Take 1 mg by mouth every bedtime.   esomeprazole (NEXIUM) 40 MG delayed-release capsule 9/21/2018 at pm  Yes Yes   Sig: Take 40 mg by mouth 2 Times a Day.   ezetimibe (ZETIA) 10 MG Tab 9/21/2018 at pm  Yes No   Sig: Take 10 mg by mouth every evening.   ipratropium (ATROVENT) 0.03 % Solution   Yes Yes   Sig: Spray 2 Sprays in nose every bedtime.   levalbuterol (XOPENEX HFA) 45 MCG/ACT inhaler 9/22/2018 at am  Yes Yes   Sig: Inhale 2 Puffs by mouth every 6 hours as needed for Shortness of Breath. 2 puffs every morning and then prn   polyethylene glycol/lytes (MIRALAX) Pack   Yes Yes   Sig: Take 17 g by mouth every bedtime.   predniSONE (DELTASONE) 1 MG  Tab   Yes Yes   Sig: Take 8 mg by mouth every day.      Facility-Administered Medications: None       Physical Exam  Temp:  [36.4 °C (97.6 °F)] 36.4 °C (97.6 °F)  Pulse:  [55-64] 60  Resp:  [11-20] 19  BP: (113)/(72) 113/72    Physical Exam   Constitutional: He is oriented to person, place, and time. He appears well-developed.  Non-toxic appearance. No distress.   HENT:   Head: Normocephalic and atraumatic.   Mouth/Throat: Oropharynx is clear and moist. No oropharyngeal exudate.   Eyes: Right eye exhibits no discharge. Left eye exhibits no discharge.   Neck: Neck supple. No tracheal deviation, no edema and no erythema present.   Cardiovascular: Normal rate and regular rhythm.  Exam reveals no gallop and no friction rub.    No murmur heard.  Pulmonary/Chest: Effort normal and breath sounds normal. No stridor. No respiratory distress. He has no wheezes. He has no rales. He exhibits no tenderness.   Abdominal: Soft. Bowel sounds are normal. He exhibits no distension. There is no tenderness.   Musculoskeletal: Normal range of motion. He exhibits no edema or tenderness.   Lymphadenopathy:     He has no cervical adenopathy.   Neurological: He is alert and oriented to person, place, and time. No cranial nerve deficit.   Skin: Skin is warm and dry. No rash noted. He is not diaphoretic. No erythema.   Psychiatric: He has a normal mood and affect. His behavior is normal. Judgment and thought content normal.   Nursing note and vitals reviewed.      Laboratory:  Recent Labs      09/22/18   1025   WBC  10.2   RBC  4.75   HEMOGLOBIN  14.9   HEMATOCRIT  43.7   MCV  92.0   MCH  31.4   MCHC  34.1   RDW  43.4   PLATELETCT  168   MPV  9.3     Recent Labs      09/22/18   1025   SODIUM  137   POTASSIUM  3.7   CHLORIDE  106   CO2  24   GLUCOSE  100*   BUN  12   CREATININE  1.07   CALCIUM  9.1     Recent Labs      09/22/18   1025   ALTSGPT  15   ASTSGOT  23   ALKPHOSPHAT  22*   TBILIRUBIN  0.6   LIPASE  27   GLUCOSE  100*     Recent Labs       09/22/18   1025   APTT  26.2   INR  1.08     Recent Labs      09/22/18   1025   BNPBTYPENAT  43         Lab Results   Component Value Date    TROPONINI <0.02 09/22/2018       Urinalysis:    No results found     Imaging:  DX-CHEST-PORTABLE (1 VIEW)   Final Result      Mild hypoinflation with minimal LEFT basilar atelectasis.      NM-CARDIAC STRESS TEST    (Results Pending)         Assessment/Plan:  I anticipate this patient is appropriate for observation status at this time.    Chest pain   Assessment & Plan    -Trend troponins, monitor on telemetry and repeat EKG  -If no concerning trend, obtain stress test, patient states he is able to ambulate on treadmill to stress  -While patient does have esophageal spasm, did just have treatment, has never had pain with them, do not think this is the diagnosis, do not feel calcium channel blocker is needed at this time  -Likely due to stress  -Symptomatic care        Adrenal insufficiency, chronic- (present on admission)   Assessment & Plan    -Has been on prednisone for years, continue home dose of prednisone        Bradycardia- (present on admission)   Assessment & Plan    -During the exam most of the time it was normal however there was occasional runs when he was in the upper 50s  -Apparently has dropped down into the 40s whenever he had the event monitor  -Outpatient follow-up with cardiology and Nehemiah WARREN (hyperlipidemia)- (present on admission)   Assessment & Plan    -Continue niacin and Zetia            VTE prophylaxis: Lovenox

## 2018-09-22 NOTE — CARE PLAN
Problem: Safety  Goal: Will remain free from injury  Patient educated regarding fall precautions and verbalized understanding. Non slip socks on patient. Bed alarm is not on as patient is safe to be up self. Patient verbalized understanding regarding use of call light for assistance. Mobility assessed and proper sign placed on door.     Problem: Knowledge Deficit  Goal: Knowledge of disease process/condition, treatment plan, diagnostic tests, and medications will improve  POC discussed with patient. All questions answered. Patient verbalized understanding.

## 2018-09-22 NOTE — PROGRESS NOTES
Received patient from ER. Patient ambulatory from [facility] to [facility]. Patient attached to telemetry monitor and oriented to room and use of call light. Admit profile completed. Patient would like to take his personal medications, RN spoke with MD and per Dr. Orozco this is okay. Patients denies any pain at this time. All needs are currently met. All safety precautions in place. Will continue to monitor.

## 2018-09-23 ENCOUNTER — APPOINTMENT (OUTPATIENT)
Dept: RADIOLOGY | Facility: MEDICAL CENTER | Age: 73
End: 2018-09-23
Attending: INTERNAL MEDICINE
Payer: MEDICARE

## 2018-09-23 ENCOUNTER — PATIENT OUTREACH (OUTPATIENT)
Dept: HEALTH INFORMATION MANAGEMENT | Facility: OTHER | Age: 73
End: 2018-09-23

## 2018-09-23 VITALS
TEMPERATURE: 97.6 F | SYSTOLIC BLOOD PRESSURE: 104 MMHG | HEIGHT: 70 IN | BODY MASS INDEX: 23.04 KG/M2 | RESPIRATION RATE: 18 BRPM | DIASTOLIC BLOOD PRESSURE: 59 MMHG | HEART RATE: 61 BPM | OXYGEN SATURATION: 92 % | WEIGHT: 160.94 LBS

## 2018-09-23 PROBLEM — R07.9 CHEST PAIN: Status: RESOLVED | Noted: 2018-09-22 | Resolved: 2018-09-23

## 2018-09-23 PROBLEM — R00.1 BRADYCARDIA: Status: RESOLVED | Noted: 2018-08-16 | Resolved: 2018-09-23

## 2018-09-23 PROCEDURE — G0378 HOSPITAL OBSERVATION PER HR: HCPCS

## 2018-09-23 PROCEDURE — A9502 TC99M TETROFOSMIN: HCPCS

## 2018-09-23 PROCEDURE — 99217 PR OBSERVATION CARE DISCHARGE: CPT | Performed by: INTERNAL MEDICINE

## 2018-09-23 ASSESSMENT — PAIN SCALES - GENERAL: PAINLEVEL_OUTOF10: 0

## 2018-09-23 ASSESSMENT — PATIENT HEALTH QUESTIONNAIRE - PHQ9
SUM OF ALL RESPONSES TO PHQ9 QUESTIONS 1 AND 2: 0
1. LITTLE INTEREST OR PLEASURE IN DOING THINGS: NOT AT ALL
2. FEELING DOWN, DEPRESSED, IRRITABLE, OR HOPELESS: NOT AT ALL

## 2018-09-23 NOTE — PROGRESS NOTES
0900 Assessment completed. Pt AAOx4. Denies any pain, SOB. Safety and comfort measures in place. Pt in bed watching tv. No additional needs at this time.      0230 Pt resting w/ eyes close, breathing even and unlabored. Safety precautions in place.

## 2018-09-23 NOTE — PROGRESS NOTES
Report received from NOC RN, POC discussed, walking rounds completed, pt awake in bed, to NUC med at 0900, all orders, medications and lines verified, nursing order that patient may take home meds, no s/s distress, call light within reach and will continue to monitor.

## 2018-09-23 NOTE — DISCHARGE INSTRUCTIONS
Discharge Instructions    Discharged to home by car with relative. Discharged via walking, hospital escort: Refused.  Special equipment needed: Not Applicable    Be sure to schedule a follow-up appointment with your primary care doctor or any specialists as instructed.     Discharge Plan:   Diet Plan: Discussed  Activity Level: Discussed  Confirmed Follow up Appointment: Patient to Call and Schedule Appointment  Confirmed Symptoms Management: Discussed  Medication Reconciliation Updated: Yes  Influenza Vaccine Indication: Not indicated: Previously immunized this influenza season and > 8 years of age    I understand that a diet low in cholesterol, fat, and sodium is recommended for good health. Unless I have been given specific instructions below for another diet, I accept this instruction as my diet prescription.       Special Instructions: None    · Is patient discharged on Warfarin / Coumadin?   No     Depression / Suicide Risk    As you are discharged from this [facility] facility, it is important to learn how to keep safe from harming yourself.    Recognize the warning signs:  · Abrupt changes in personality, positive or negative- including increase in energy   · Giving away possessions  · Change in eating patterns- significant weight changes-  positive or negative  · Change in sleeping patterns- unable to sleep or sleeping all the time   · Unwillingness or inability to communicate  · Depression  · Unusual sadness, discouragement and loneliness  · Talk of wanting to die  · Neglect of personal appearance   · Rebelliousness- reckless behavior  · Withdrawal from people/activities they love  · Confusion- inability to concentrate     If you or a loved one observes any of these behaviors or has concerns about self-harm, here's what you can do:  · Talk about it- your feelings and reasons for harming yourself  · Remove any means that you might use to hurt yourself (examples: pills, rope, extension cords,  firearm)  · Get professional help from the community (Mental Health, Substance Abuse, psychological counseling)  · Do not be alone:Call your Safe Contact- someone whom you trust who will be there for you.  · Call your local CRISIS HOTLINE 200-7496 or 336-437-5355  · Call your local Children's Mobile Crisis Response Team [place] (786) 364-1754 or www.[employer]  · Call the toll free National Suicide Prevention Hotlines   · National Suicide Prevention Lifeline 725-064-VXQB (1243)  · National Hope Line Network 800-SUICIDE (687-8902)

## 2018-09-23 NOTE — PROGRESS NOTES
Report received from JORDY Egan. POC discussed. Pt resting comfortably in bed.  Denies pain/SOB at this time. Reminded to call when in need. Safety precautions in place.

## 2018-09-23 NOTE — PROGRESS NOTES
Report given to Anju SPENCE. Plan of care discussed. Pt resting in bed with safety precautions in place.

## 2018-09-23 NOTE — CARE PLAN
Problem: Communication  Goal: The ability to communicate needs accurately and effectively will improve    Intervention: [place] patient and significant other/support system to call light to alert staff of needs  A/O x r, call light is within reach and hourly rounding maintained.        Problem: Knowledge Deficit  Goal: Knowledge of disease process/condition, treatment plan, diagnostic tests, and medications will improve    Intervention: Assess knowledge level of disease process/condition, treatment plan, diagnostic tests, and medications  POC dicussed, awaiting stress test results.

## 2018-09-23 NOTE — CARE PLAN
Problem: Safety  Goal: Will remain free from injury  Outcome: PROGRESSING AS EXPECTED  Pt encouraged to call when in need, call light and belonging in place, room free of clutter    Problem: Knowledge Deficit  Goal: Knowledge of disease process/condition, treatment plan, diagnostic tests, and medications will improve  Outcome: PROGRESSING AS EXPECTED  Pt informed and educated about treatment plans and medications, and tests (i.e stress test); encourage to voice out feelings.

## 2018-09-23 NOTE — DISCHARGE SUMMARY
"Discharge Summary    CHIEF COMPLAINT ON ADMISSION  Chief Complaint   Patient presents with   • Chest Pain     Pt c/o CP onset this am 0405. Pt states CP woke him from sleep.        Reason for Admission  Chest Pains     Admission Date  9/22/2018    CODE STATUS  Full Code    HPI & HOSPITAL COURSE  This is a 73 y.o. male here with chest pain.  Patient stated it started at 4 AM the morning prior to admission.  At that point in time he was at hotel in [place], did not mention it to his wife because he did not want to miss their flight.  Patient flew back, pressure persisted so he told his wife, they came to the emergency department.  Upon arrival patient had a negative troponin, negative EKG.  Patient was monitored on telemetry, no changes will obtain a stress test which was negative.  Patient does spend a lot of time in [facility] as well as [place], did recently have an event monitor, he was told he either have to have a pacemaker versus implantable monitor.  He did have some mild bradycardia during the hospital stay but nothing significant.  Patient also states he has a diagnosis of \"esophageal asthma\".  He did have an EGD today prior to admission, Botox injections at the [facility].  He currently has resolution of his pain, is eating and drinking normally, no shortness of breath.    Therefore, he is discharged in good and stable condition to home with close outpatient follow-up.        Discharge Date  9/23/18    FOLLOW UP ITEMS POST DISCHARGE  Follow-up with primary care provider upon arrival back in [place]  Emergency department or 911 in case of emergency    DISCHARGE DIAGNOSES  Active Problems:    Adrenal insufficiency, chronic POA: Yes    HLD (hyperlipidemia) POA: Yes  Resolved Problems:    Chest pain POA: Unknown    Bradycardia POA: Yes      FOLLOW UP  Future Appointments  Date Time Provider Department Center   9/25/2018 1:40 PM Yulia Gee M.D. [facility] None   1/14/2019 2:00 PM Bjorn Tolliver M.D. [facility] " None     No follow-up provider specified.    MEDICATIONS ON DISCHARGE     Medication List      CONTINUE taking these medications      Instructions   artificial tear ointment Oint  Commonly known as:  REFRESHLACRI-LUBE   Place 2 Applications in both eyes 2 Times a Day.  Dose:  2 Application     aspirin 81 MG tablet   Take 81 mg by mouth every day.  Dose:  81 mg     Cholecalciferol 5000 units Tabs   Take 5,000 Units by mouth every day.  Dose:  5000 Units     clonazePAM 1 MG Tabs  Commonly known as:  KLONOPIN   Take 1 mg by mouth every bedtime.  Dose:  1 mg     ezetimibe 10 MG Tabs  Commonly known as:  ZETIA   Take 10 mg by mouth every evening.  Dose:  10 mg     ipratropium 0.03 % Soln  Commonly known as:  ATROVENT   Spray 2 Sprays in nose every bedtime.  Dose:  2 Spray     Melatonin 5 MG Tabs   Take 6 mg by mouth every bedtime.  Dose:  6 mg     NEXIUM 40 MG delayed-release capsule  Generic drug:  esomeprazole   Take 40 mg by mouth 2 Times a Day.  Dose:  40 mg     Niacin CR 1000 MG Tbcr   Take 1,000 mg by mouth every evening.  Dose:  1000 mg     NUCALA SC   Inject  as instructed Q 4 Weeks.     polyethylene glycol/lytes Pack  Commonly known as:  MIRALAX   Take 17 g by mouth every bedtime.  Dose:  17 g     predniSONE 1 MG Tabs  Commonly known as:  DELTASONE   Take 8 mg by mouth every day.  Dose:  8 mg     TESTOSTERONE IM   by Intramuscular route. Pellets implanted in to left hip     XOPENEX HFA 45 MCG/ACT inhaler  Generic drug:  levalbuterol   Inhale 2 Puffs by mouth every 6 hours as needed for Shortness of Breath. 2 puffs every morning and then prn  Dose:  2 Puff            Allergies  Allergies   Allergen Reactions   • Avelox [Moxifloxacin Hcl In Nacl]      Inability to function - muscle pain   • Statins [Hmg-Coa-R Inhibitors]      Muscle and Joint Pain   • Talwin Nausea       DIET  Orders Placed This Encounter   Procedures   • Diet Order Regular     Standing Status:   Standing     Number of Occurrences:   1     Order  Specific Question:   Diet:     Answer:   Regular [1]     Order Specific Question:   Miscellaneous modifications:     Answer:   No Decaf, No Caffeine(for test) [11]     Comments:   Protocol 1312 No caffeine for 12 hours prior to exam (decaf, coffee, cola, tea, chocolate)       ACTIVITY  As tolerated.  Weight bearing as tolerated    CONSULTATIONS  None    PROCEDURES  None    LABORATORY  Lab Results   Component Value Date    SODIUM 137 09/22/2018    POTASSIUM 3.7 09/22/2018    CHLORIDE 106 09/22/2018    CO2 24 09/22/2018    GLUCOSE 100 (H) 09/22/2018    BUN 12 09/22/2018    CREATININE 1.07 09/22/2018        Lab Results   Component Value Date    WBC 10.2 09/22/2018    HEMOGLOBIN 14.9 09/22/2018    HEMATOCRIT 43.7 09/22/2018    PLATELETCT 168 09/22/2018

## 2018-09-23 NOTE — PROGRESS NOTES
Nursing care plan includes knowledge deficit, potential for discomfort, potential for compromised cardiac output. POC includes teaching, comfort measures and reassurance, and access to code cart, cardiology stand by and availability of rapid response team. Pt verbalizes good understanding of benefits and risks of Reymundo protocol cardiac stress test. Informed consent obtained. Pt completed 10 minutes of Reymundo with isotope injected at 85% max HR and continuing one minute more, pt developed the following symptoms SOB at last stage and in recovery VS stable, major symptoms resolved. To waiting room, caffeinated fluids and/or snacks given, awaiting second scan. Nursing goals met. Will return to room after second scan

## 2018-09-25 ENCOUNTER — OFFICE VISIT (OUTPATIENT)
Dept: INTERNAL MEDICINE | Facility: MEDICAL CENTER | Age: 73
End: 2018-09-25
Payer: MEDICARE

## 2018-09-25 VITALS
HEART RATE: 63 BPM | BODY MASS INDEX: 24.4 KG/M2 | TEMPERATURE: 98.7 F | HEIGHT: 68 IN | SYSTOLIC BLOOD PRESSURE: 116 MMHG | DIASTOLIC BLOOD PRESSURE: 76 MMHG | WEIGHT: 161 LBS | OXYGEN SATURATION: 98 %

## 2018-09-25 DIAGNOSIS — J45.20 MILD INTERMITTENT ASTHMA WITHOUT COMPLICATION: ICD-10-CM

## 2018-09-25 DIAGNOSIS — G45.4 TGA (TRANSIENT GLOBAL AMNESIA): ICD-10-CM

## 2018-09-25 PROCEDURE — 99204 OFFICE O/P NEW MOD 45 MIN: CPT | Performed by: INTERNAL MEDICINE

## 2018-09-25 ASSESSMENT — PAIN SCALES - GENERAL: PAINLEVEL: NO PAIN

## 2018-09-25 NOTE — LETTER
[facility]  Yulia Gee M.D.  [address] [facility] [address]  [place] [address]  Fax: [phone]   Authorization for Release/Disclosure of   Protected Health Information   Name: JAYESH SHIPLEY : 1945 SSN: [id number]   Address: [address] [place] [address] Phone:    [phone] (home)    I authorize the entity listed below to release/disclose the PHI below to:   [facility]/Yulia Gee M.D. and Yulia Gee M.D.   Provider or Entity Name:     Address   City, State, [facility]   Phone:      Fax:     Reason for request: continuity of care   Information to be released:    [  ] LAST COLONOSCOPY,  including any PATH REPORT and follow-up  [  ] LAST FIT/COLOGUARD RESULT [  ] LAST DEXA  [  ] LAST MAMMOGRAM  [  ] LAST PAP  [  ] LAST LABS [  ] RETINA EXAM REPORT  [  ] IMMUNIZATION RECORDS  [  ] Release all info      [  ] Check here and initial the line next to each item to release ALL health information INCLUDING  _____ Care and treatment for drug and / or alcohol abuse  _____ HIV testing, infection status, or AIDS  _____ Genetic Testing    DATES OF SERVICE OR TIME PERIOD TO BE DISCLOSED: _____________  I understand and acknowledge that:  * This Authorization may be revoked at any time by you in writing, except if your health information has already been used or disclosed.  * Your health information that will be used or disclosed as a result of you signing this authorization could be re-disclosed by the recipient. If this occurs, your re-disclosed health information may no longer be protected by State or Federal laws.  * You may refuse to sign this Authorization. Your refusal will not affect your ability to obtain treatment.  * This Authorization becomes effective upon signing and will  on (date) __________.      If no date is indicated, this Authorization will  one (1) year from the signature date.    Name: Jayesh Shipley    Signature:   Date:     2018       PLEASE  FAX REQUESTED RECORDS BACK TO: [phone]

## 2018-09-25 NOTE — PROGRESS NOTES
New Patient to Establish    Reason to establish: New patient to establish    CC: here to get established    HPI: 73 year old gentleman is here to get established, however he stays in [place] for 6 month and in [place], [place] for other 6 months. He also goes to [place] to see his GI specialist, cardiologist and neurologist.  He has PMH of:  Esophageal Jack -hammer ( esophageal Asthma?) that has been diagnosed in [place] and he receives Botox injections once every few months, He states that he is also on Prednisone 8 mg daily to suppress his symptoms, he also has hx of elevated Ig E and prednisone also decreases the Ig E level. He is also on Mepolizumab.  Tranasient Global amnesia which happed recently and he has been seen by neurology and had an MRI of Brain in August 2018 which did not show any significant abnormality.  Adrenal insufficiency secondary to chronic Prednisone.      Patient Active Problem List    Diagnosis Date Noted   • Toxic metabolic encephalopathy 08/16/2018     Priority: High   • Adrenal insufficiency, chronic 08/16/2018     Priority: Medium   • HLD (hyperlipidemia) 08/16/2018     Priority: Low   • TGA (transient global amnesia) 09/25/2018   • Mild intermittent asthma without complication 09/25/2018       Past Medical History:   Diagnosis Date   • Allergy    • Asthma    • Back pain     chronic   • Fall    • Fracture 1977    Jaw - lower condyle fracture   • GERD (gastroesophageal reflux disease)    • Hyperlipidemia    • IBD (inflammatory bowel disease)        Current Outpatient Prescriptions   Medication Sig Dispense Refill   • esomeprazole (NEXIUM) 40 MG delayed-release capsule Take 40 mg by mouth 2 Times a Day.     • clonazePAM (KLONOPIN) 1 MG Tab Take 1 mg by mouth every bedtime.     • levalbuterol (XOPENEX HFA) 45 MCG/ACT inhaler Inhale 2 Puffs by mouth every 6 hours as needed for Shortness of Breath. 2 puffs every morning and then prn     • predniSONE (DELTASONE) 1 MG Tab Take 8 mg by  mouth every day.     • Cholecalciferol 5000 units Tab Take 5,000 Units by mouth every day.     • polyethylene glycol/lytes (MIRALAX) Pack Take 17 g by mouth every bedtime.     • Melatonin 5 MG Tab Take 6 mg by mouth every bedtime.     • artificial tear ointment (REFRESH,LACRI-LUBE) Ointment Place 2 Applications in both eyes 2 Times a Day.     • Mepolizumab (NUCALA SC) Inject  as instructed Q 4 Weeks.     • ezetimibe (ZETIA) 10 MG Tab Take 10 mg by mouth every evening.     • aspirin 81 MG tablet Take 81 mg by mouth every day.       No current facility-administered medications for this visit.        Allergies as of 09/25/2018 - Reviewed 09/25/2018   Allergen Reaction Noted   • Avelox [moxifloxacin hcl in nacl]  08/09/2016   • Statins [hmg-coa-r inhibitors]  08/09/2016   • Talwin Nausea 08/09/2016       Social History     Social History   • Marital status:      Spouse name: N/A   • Number of children: N/A   • Years of education: N/A     Occupational History   • Not on file.     Social History Main Topics   • Smoking status: Former Smoker     Packs/day: 1.00     Years: 20.00   • Smokeless tobacco: Never Used   • Alcohol use No   • Drug use: No   • Sexual activity: Not on file     Other Topics Concern   • Not on file     Social History Narrative   • No narrative on file       Family History   Problem Relation Age of Onset   • Allergies Mother    • Asthma Mother    • Heart Attack Father    • Heart Disease Father    • Hyperlipidemia Father    • Hypertension Father    • Kidney stones Father    • Stroke Father    • Allergies Sister    • Arrythmia Sister    • Depression Sister    • Diabetes Sister    • Heart Attack Sister    • Heart Disease Sister    • Hypertension Brother    • Hyperlipidemia Brother    • Cancer Brother    • Heart Disease Brother    • Heart Attack Brother        Past Surgical History:   Procedure Laterality Date   • OTHER  2015    Removal of large polyp and 2 med sized polyps   • OTHER Left 1980     "Left jaw surgery following surgery   • LYMPH NODE EXCISION  1976   • OTHER      Jaw Surgery   • OTHER ORTHOPEDIC SURGERY Left     Left jaw surgery after injury   • TONSILLECTOMY         ROS: As per HPI. Additional pertinent symptoms as noted below.    Constitutional: no weight gain or loss, no Fever/chills  Eyes: no blurred vision. no itiching  ENT: no sore throat, no ear pain  Cardiovascular: no CP. no palpitation  Respiratory: no sob, no cough  GI: no diarrhea, no abdominal pain  : no dysuria, no frequency  Musculo-skeletal: no mylagia, no arthralgia    /76 (BP Location: Right arm, Patient Position: Sitting)   Pulse 63   Temp 37.1 °C (98.7 °F) (Temporal)   Ht 1.727 m (5' 8\")   Wt 73 kg (161 lb)   SpO2 98%   BMI 24.48 kg/m²     Physical Exam  General:  Alert and oriented, No apparent distress.    Eyes: Pupils equal and reactive. No scleral icterus.    Throat: Clear no erythema or exudates noted.    Neck: Supple. No lymphadenopathy noted. Thyroid not enlarged.    Lungs: Clear to auscultation and percussion bilaterally.    Cardiovascular: Regular rate and rhythm. No murmurs, rubs or gallops.    Abdomen:  Benign. No rebound or guarding noted.    Extremities: No clubbing, cyanosis, edema.    Skin: Clear. No rash or suspicious skin lesions noted.        Note: I have reviewed all pertinent labs and diagnostic tests associated with this visit with specific comments listed under the assessment and plan below    Assessment and Plan    1. TGA (transient global amnesia)  He had a normal MRI on 8/16/2018 and seen by Neurology.    2. Mild intermittent asthma without complication  On Levalbuterol. Also on Prednison 8 mg daily. No symptoms.    3.Esophageal Lars -hammer ( esophageal Asthma?)   that has been diagnosed in [place] and he receives Botox injections once every few months, He states that he is also on Prednisone 8 mg daily to suppress his symptoms, he also has hx of elevated Ig E and prednisone also " decreases the Ig E level. He is also on Mepolizumab.will follow up with his GI specialist in [place].    4; Recent Chest Pain   Was admitted to [facility] Myocardial perfusion test was normal on 9/23/18. Chest pain  was diagnosed as esophageal related pain. It was constant and right after EGD and Botox injection.  Normal ECHO in 8/2018.    Event monitor was completed for 2 weeks. It showed some supraventricular tachycardia, he wants to be followed up by his cardiologist in [place].    5. Memory loss?  Patient and wife state that they jist had MOCA last week and the result was 29/30, Patient has also completed a Neuro- psych test in the past and per wife it was normal.  So he probably has normal ageing memory loss.    Health maintenance:  He states he has had both is Pneumonia Shots and just received his FLU shot.    Followup: Return in about 6 months (around 3/25/2019).    Signed by: Yulia Gee M.D.

## 2018-10-04 DIAGNOSIS — D12.6 ADENOMATOUS POLYP OF COLON, UNSPECIFIED PART OF COLON: ICD-10-CM

## 2022-07-19 ENCOUNTER — APPOINTMENT (RX ONLY)
Dept: URBAN - METROPOLITAN AREA CLINIC 38 | Facility: CLINIC | Age: 77
Setting detail: DERMATOLOGY
End: 2022-07-19

## 2022-07-19 DIAGNOSIS — L57.8 OTHER SKIN CHANGES DUE TO CHRONIC EXPOSURE TO NONIONIZING RADIATION: ICD-10-CM

## 2022-07-19 DIAGNOSIS — L82.1 OTHER SEBORRHEIC KERATOSIS: ICD-10-CM

## 2022-07-19 DIAGNOSIS — D18.0 HEMANGIOMA: ICD-10-CM

## 2022-07-19 DIAGNOSIS — D22 MELANOCYTIC NEVI: ICD-10-CM

## 2022-07-19 DIAGNOSIS — L81.4 OTHER MELANIN HYPERPIGMENTATION: ICD-10-CM

## 2022-07-19 DIAGNOSIS — Z71.89 OTHER SPECIFIED COUNSELING: ICD-10-CM

## 2022-07-19 PROBLEM — D22.9 MELANOCYTIC NEVI, UNSPECIFIED: Status: ACTIVE | Noted: 2022-07-19

## 2022-07-19 PROBLEM — D18.01 HEMANGIOMA OF SKIN AND SUBCUTANEOUS TISSUE: Status: ACTIVE | Noted: 2022-07-19

## 2022-07-19 PROCEDURE — 99203 OFFICE O/P NEW LOW 30 MIN: CPT

## 2022-07-19 PROCEDURE — ? COUNSELING

## 2022-07-19 ASSESSMENT — LOCATION SIMPLE DESCRIPTION DERM
LOCATION SIMPLE: INFERIOR FOREHEAD
LOCATION SIMPLE: RIGHT FOREARM
LOCATION SIMPLE: RIGHT UPPER BACK
LOCATION SIMPLE: LEFT FOREARM
LOCATION SIMPLE: LEFT CHEEK
LOCATION SIMPLE: POSTERIOR NECK
LOCATION SIMPLE: LEFT ANTERIOR NECK
LOCATION SIMPLE: ABDOMEN

## 2022-07-19 ASSESSMENT — LOCATION DETAILED DESCRIPTION DERM
LOCATION DETAILED: LEFT DISTAL DORSAL FOREARM
LOCATION DETAILED: RIGHT PROXIMAL DORSAL FOREARM
LOCATION DETAILED: INFERIOR MID FOREHEAD
LOCATION DETAILED: RIGHT INFERIOR UPPER BACK
LOCATION DETAILED: LEFT CENTRAL MALAR CHEEK
LOCATION DETAILED: LEFT CLAVICULAR NECK
LOCATION DETAILED: RIGHT MEDIAL TRAPEZIAL NECK
LOCATION DETAILED: PERIUMBILICAL SKIN

## 2022-07-19 ASSESSMENT — LOCATION ZONE DERM
LOCATION ZONE: FACE
LOCATION ZONE: TRUNK
LOCATION ZONE: ARM
LOCATION ZONE: NECK

## 2023-11-29 ENCOUNTER — PATIENT MESSAGE (OUTPATIENT)
Dept: HEALTH INFORMATION MANAGEMENT | Facility: OTHER | Age: 78
End: 2023-11-29

## 2024-02-20 ENCOUNTER — APPOINTMENT (RX ONLY)
Dept: URBAN - METROPOLITAN AREA CLINIC 10 | Facility: CLINIC | Age: 79
Setting detail: DERMATOLOGY
End: 2024-02-20

## 2024-02-20 DIAGNOSIS — D18.0 HEMANGIOMA: ICD-10-CM | Status: STABLE

## 2024-02-20 DIAGNOSIS — D22 MELANOCYTIC NEVI: ICD-10-CM | Status: STABLE

## 2024-02-20 DIAGNOSIS — L72.0 EPIDERMAL CYST: ICD-10-CM | Status: STABLE

## 2024-02-20 DIAGNOSIS — L81.4 OTHER MELANIN HYPERPIGMENTATION: ICD-10-CM | Status: STABLE

## 2024-02-20 DIAGNOSIS — L85.3 XEROSIS CUTIS: ICD-10-CM | Status: STABLE

## 2024-02-20 DIAGNOSIS — L82.1 OTHER SEBORRHEIC KERATOSIS: ICD-10-CM | Status: STABLE

## 2024-02-20 DIAGNOSIS — L57.8 OTHER SKIN CHANGES DUE TO CHRONIC EXPOSURE TO NONIONIZING RADIATION: ICD-10-CM | Status: STABLE

## 2024-02-20 DIAGNOSIS — L57.0 ACTINIC KERATOSIS: ICD-10-CM | Status: INADEQUATELY CONTROLLED

## 2024-02-20 DIAGNOSIS — L82.0 INFLAMED SEBORRHEIC KERATOSIS: ICD-10-CM | Status: INADEQUATELY CONTROLLED

## 2024-02-20 PROBLEM — D23.22 OTHER BENIGN NEOPLASM OF SKIN OF LEFT EAR AND EXTERNAL AURICULAR CANAL: Status: ACTIVE | Noted: 2024-02-20

## 2024-02-20 PROBLEM — D18.01 HEMANGIOMA OF SKIN AND SUBCUTANEOUS TISSUE: Status: ACTIVE | Noted: 2024-02-20

## 2024-02-20 PROBLEM — D22.62 MELANOCYTIC NEVI OF LEFT UPPER LIMB, INCLUDING SHOULDER: Status: ACTIVE | Noted: 2024-02-20

## 2024-02-20 PROCEDURE — 99203 OFFICE O/P NEW LOW 30 MIN: CPT | Mod: 25

## 2024-02-20 PROCEDURE — ? TREATMENT REGIMEN

## 2024-02-20 PROCEDURE — ? LIQUID NITROGEN

## 2024-02-20 PROCEDURE — 17110 DESTRUCTION B9 LES UP TO 14: CPT

## 2024-02-20 PROCEDURE — 17003 DESTRUCT PREMALG LES 2-14: CPT | Mod: 59

## 2024-02-20 PROCEDURE — ? FULL BODY SKIN EXAM

## 2024-02-20 PROCEDURE — ? COUNSELING

## 2024-02-20 PROCEDURE — 17000 DESTRUCT PREMALG LESION: CPT | Mod: 59

## 2024-02-20 ASSESSMENT — LOCATION SIMPLE DESCRIPTION DERM
LOCATION SIMPLE: RIGHT LIP
LOCATION SIMPLE: LEFT LOWER LEG
LOCATION SIMPLE: RIGHT CALF
LOCATION SIMPLE: ABDOMEN
LOCATION SIMPLE: LEFT FOREHEAD
LOCATION SIMPLE: LEFT CHEEK
LOCATION SIMPLE: RIGHT PRETIBIAL REGION
LOCATION SIMPLE: LEFT UPPER BACK
LOCATION SIMPLE: LEFT FOREARM
LOCATION SIMPLE: CHEST

## 2024-02-20 ASSESSMENT — LOCATION DETAILED DESCRIPTION DERM
LOCATION DETAILED: RIGHT MEDIAL SUPERIOR CHEST
LOCATION DETAILED: LEFT SUPERIOR FOREHEAD
LOCATION DETAILED: EPIGASTRIC SKIN
LOCATION DETAILED: LEFT PROXIMAL LATERAL PRETIBIAL REGION
LOCATION DETAILED: LEFT SUPERIOR MEDIAL UPPER BACK
LOCATION DETAILED: RIGHT DISTAL PRETIBIAL REGION
LOCATION DETAILED: RIGHT UPPER CUTANEOUS LIP
LOCATION DETAILED: LEFT PROXIMAL DORSAL FOREARM
LOCATION DETAILED: RIGHT DISTAL CALF
LOCATION DETAILED: LEFT SUPERIOR UPPER BACK
LOCATION DETAILED: LEFT SUPERIOR CENTRAL MALAR CHEEK

## 2024-02-20 ASSESSMENT — LOCATION ZONE DERM
LOCATION ZONE: LIP
LOCATION ZONE: FACE
LOCATION ZONE: ARM
LOCATION ZONE: TRUNK
LOCATION ZONE: LEG

## 2024-02-20 NOTE — PROCEDURE: MIPS QUALITY
Detail Level: Detailed
Quality 431: Preventive Care And Screening: Unhealthy Alcohol Use - Screening: Patient not identified as an unhealthy alcohol user when screened for unhealthy alcohol use using a systematic screening method
Quality 47: Advance Care Plan: Advance Care Planning discussed and documented; advance care plan or surrogate decision maker documented in the medical record.
Quality 110: Preventive Care And Screening: Influenza Immunization: Influenza Immunization previously received during influenza season
Quality 226: Preventive Care And Screening: Tobacco Use: Screening And Cessation Intervention: Patient screened for tobacco use and is an ex/non-smoker
Quality 130: Documentation Of Current Medications In The Medical Record: Current Medications Documented
CONSTITUTIONAL: well-appearing, in NAD  SKIN: Warm dry, normal skin turgor  HEAD: NCAT  EYES: EOMI, PERRLA, no scleral icterus, conjunctiva pink  ENT: normal pharynx with no erythema or exudates  NECK: Supple; non tender. Full ROM.  CARD: RRR, no murmurs.  RESP: clear to ausculation b/l. No crackles or wheezing.  ABD: soft, non-tender, non-distended, no rebound or guarding.  EXT: Full ROM, + dorsal midline right wrist ttp, pain with flex and extension, no pedal edema, no calf tenderness  NEURO: normal motor. normal sensory. CN II-XII intact. Cerebellar testing normal. Normal gait.  PSYCH: Cooperative, appropriate.

## 2024-02-20 NOTE — HPI: EVALUATION OF SKIN LESION(S)
What Type Of Note Output Would You Prefer (Optional)?: Bullet Format
Hpi Title: Evaluation of Skin Lesions
How Severe Are Your Spot(S)?: mild
Have Your Spot(S) Been Treated In The Past?: has not been treated
Additional History: Rt. Forearm\\nLt. Lower leg- itchy\\nAround mouth

## 2024-02-20 NOTE — PROCEDURE: LIQUID NITROGEN
Consent: The patient's consent was obtained including but not limited to risks of crusting, scabbing, blistering, scarring, darker or lighter pigmentary change, recurrence, incomplete removal and infection.
Render Post-Care Instructions In Note?: no
Detail Level: Detailed
Duration Of Freeze Thaw-Cycle (Seconds): 0
Post-Care Instructions: I reviewed with the patient in detail post-care instructions. Patient is to wear sunprotection, and avoid picking at any of the treated lesions. Pt may apply Vaseline to crusted or scabbing areas.
Show Applicator Variable?: Yes
Number Of Freeze-Thaw Cycles: 2 freeze-thaw cycles
Medical Necessity Information: It is in your best interest to select a reason for this procedure from the list below. All of these items fulfill various CMS LCD requirements except the new and changing color options.
Medical Necessity Clause: This procedure was medically necessary because the lesions that were treated were:
Spray Paint Text: The liquid nitrogen was applied to the skin utilizing a spray paint frosting technique.

## 2025-02-26 ENCOUNTER — APPOINTMENT (OUTPATIENT)
Dept: URBAN - METROPOLITAN AREA CLINIC 295 | Facility: CLINIC | Age: 80
Setting detail: DERMATOLOGY
End: 2025-02-26

## 2025-02-26 DIAGNOSIS — L72.8 OTHER FOLLICULAR CYSTS OF THE SKIN AND SUBCUTANEOUS TISSUE: ICD-10-CM

## 2025-02-26 DIAGNOSIS — D22 MELANOCYTIC NEVI: ICD-10-CM

## 2025-02-26 DIAGNOSIS — L57.8 OTHER SKIN CHANGES DUE TO CHRONIC EXPOSURE TO NONIONIZING RADIATION: ICD-10-CM

## 2025-02-26 DIAGNOSIS — L82.1 OTHER SEBORRHEIC KERATOSIS: ICD-10-CM

## 2025-02-26 DIAGNOSIS — L81.4 OTHER MELANIN HYPERPIGMENTATION: ICD-10-CM

## 2025-02-26 DIAGNOSIS — L73.8 OTHER SPECIFIED FOLLICULAR DISORDERS: ICD-10-CM

## 2025-02-26 DIAGNOSIS — Z12.83 ENCOUNTER FOR SCREENING FOR MALIGNANT NEOPLASM OF SKIN: ICD-10-CM

## 2025-02-26 PROBLEM — D22.5 MELANOCYTIC NEVI OF TRUNK: Status: ACTIVE | Noted: 2025-02-26

## 2025-02-26 PROCEDURE — ? SUNSCREEN RECOMMENDATIONS

## 2025-02-26 PROCEDURE — ? COUNSELING

## 2025-02-26 PROCEDURE — ? EXTRACTIONS

## 2025-02-26 PROCEDURE — 99213 OFFICE O/P EST LOW 20 MIN: CPT

## 2025-02-26 ASSESSMENT — LOCATION DETAILED DESCRIPTION DERM
LOCATION DETAILED: RIGHT ORAL COMMISSURE
LOCATION DETAILED: LEFT SUPERIOR MEDIAL BUCCAL CHEEK
LOCATION DETAILED: LEFT INFERIOR FOREHEAD
LOCATION DETAILED: RIGHT LOWER CUTANEOUS LIP
LOCATION DETAILED: LEFT MEDIAL INFERIOR CHEST
LOCATION DETAILED: LEFT MEDIAL UPPER BACK
LOCATION DETAILED: RIGHT INFERIOR POSTERIOR NECK
LOCATION DETAILED: STERNUM
LOCATION DETAILED: RIGHT INFERIOR FOREHEAD
LOCATION DETAILED: LEFT LATERAL SUPERIOR CHEST
LOCATION DETAILED: RIGHT INFERIOR MEDIAL FOREHEAD
LOCATION DETAILED: RIGHT MEDIAL TRAPEZIAL NECK

## 2025-02-26 ASSESSMENT — LOCATION SIMPLE DESCRIPTION DERM
LOCATION SIMPLE: LEFT CHEEK
LOCATION SIMPLE: RIGHT LIP
LOCATION SIMPLE: RIGHT FOREHEAD
LOCATION SIMPLE: LEFT FOREHEAD
LOCATION SIMPLE: CHEST
LOCATION SIMPLE: LEFT UPPER BACK
LOCATION SIMPLE: POSTERIOR NECK

## 2025-02-26 ASSESSMENT — LOCATION ZONE DERM
LOCATION ZONE: NECK
LOCATION ZONE: TRUNK
LOCATION ZONE: LIP
LOCATION ZONE: FACE

## 2025-02-26 NOTE — PROCEDURE: EXTRACTIONS
Consent was obtained and risks were reviewed including but not limited to scarring, infection, bleeding, scabbing, incomplete removal, and allergy to anesthesia.
Detail Level: Detailed
Render Number Of Lesions Treated: no
Acne Type: A milial cyst
Prep Text (Optional): Prior to removal the treatment areas were prepped in the usual fashion.
Post-Care Instructions: I reviewed with the patient in detail post-care instructions. Patient is to keep the treatment areas dry overnight, and then apply bacitracin twice daily until healed. Patient may apply hydrogen peroxide soaks to remove any crusting.
Extraction Method: 18 gauge needle, cotton-tipped applicators and gentle lateral pressure